# Patient Record
Sex: FEMALE | Race: BLACK OR AFRICAN AMERICAN | NOT HISPANIC OR LATINO | Employment: UNEMPLOYED | URBAN - METROPOLITAN AREA
[De-identification: names, ages, dates, MRNs, and addresses within clinical notes are randomized per-mention and may not be internally consistent; named-entity substitution may affect disease eponyms.]

---
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
## 2023-01-04 ENCOUNTER — OFFICE VISIT (OUTPATIENT)
Dept: CARDIOLOGY | Facility: CLINIC | Age: 18
End: 2023-01-04
Payer: COMMERCIAL

## 2023-01-04 VITALS
OXYGEN SATURATION: 98 % | BODY MASS INDEX: 20.53 KG/M2 | SYSTOLIC BLOOD PRESSURE: 118 MMHG | RESPIRATION RATE: 18 BRPM | WEIGHT: 123.38 LBS | DIASTOLIC BLOOD PRESSURE: 84 MMHG | HEART RATE: 120 BPM

## 2023-01-04 DIAGNOSIS — R00.0 SINUS TACHYCARDIA: Primary | ICD-10-CM

## 2023-01-04 DIAGNOSIS — I10 HYPERTENSION, UNSPECIFIED TYPE: ICD-10-CM

## 2023-01-04 DIAGNOSIS — E10.649 UNCONTROLLED TYPE 1 DIABETES MELLITUS WITH HYPOGLYCEMIA WITHOUT COMA: ICD-10-CM

## 2023-01-04 DIAGNOSIS — G44.59 OTHER COMPLICATED HEADACHE SYNDROME: ICD-10-CM

## 2023-01-04 DIAGNOSIS — M79.10 MYALGIA: ICD-10-CM

## 2023-01-04 PROCEDURE — 3074F PR MOST RECENT SYSTOLIC BLOOD PRESSURE < 130 MM HG: ICD-10-PCS | Mod: CPTII,S$GLB,, | Performed by: INTERNAL MEDICINE

## 2023-01-04 PROCEDURE — 3008F PR BODY MASS INDEX (BMI) DOCUMENTED: ICD-10-PCS | Mod: CPTII,S$GLB,, | Performed by: INTERNAL MEDICINE

## 2023-01-04 PROCEDURE — 3079F DIAST BP 80-89 MM HG: CPT | Mod: CPTII,S$GLB,, | Performed by: INTERNAL MEDICINE

## 2023-01-04 PROCEDURE — 93000 EKG 12-LEAD: ICD-10-PCS | Mod: S$GLB,,, | Performed by: INTERNAL MEDICINE

## 2023-01-04 PROCEDURE — 1159F MED LIST DOCD IN RCRD: CPT | Mod: CPTII,S$GLB,, | Performed by: INTERNAL MEDICINE

## 2023-01-04 PROCEDURE — 99214 OFFICE O/P EST MOD 30 MIN: CPT | Mod: S$GLB,,, | Performed by: INTERNAL MEDICINE

## 2023-01-04 PROCEDURE — 99214 PR OFFICE/OUTPT VISIT, EST, LEVL IV, 30-39 MIN: ICD-10-PCS | Mod: S$GLB,,, | Performed by: INTERNAL MEDICINE

## 2023-01-04 PROCEDURE — 1159F PR MEDICATION LIST DOCUMENTED IN MEDICAL RECORD: ICD-10-PCS | Mod: CPTII,S$GLB,, | Performed by: INTERNAL MEDICINE

## 2023-01-04 PROCEDURE — 3074F SYST BP LT 130 MM HG: CPT | Mod: CPTII,S$GLB,, | Performed by: INTERNAL MEDICINE

## 2023-01-04 PROCEDURE — 3079F PR MOST RECENT DIASTOLIC BLOOD PRESSURE 80-89 MM HG: ICD-10-PCS | Mod: CPTII,S$GLB,, | Performed by: INTERNAL MEDICINE

## 2023-01-04 PROCEDURE — 3008F BODY MASS INDEX DOCD: CPT | Mod: CPTII,S$GLB,, | Performed by: INTERNAL MEDICINE

## 2023-01-04 PROCEDURE — 93000 ELECTROCARDIOGRAM COMPLETE: CPT | Mod: S$GLB,,, | Performed by: INTERNAL MEDICINE

## 2023-01-04 PROCEDURE — 99999 PR PBB SHADOW E&M-EST. PATIENT-LVL V: ICD-10-PCS | Mod: PBBFAC,,, | Performed by: INTERNAL MEDICINE

## 2023-01-04 PROCEDURE — 99999 PR PBB SHADOW E&M-EST. PATIENT-LVL V: CPT | Mod: PBBFAC,,, | Performed by: INTERNAL MEDICINE

## 2023-01-04 NOTE — PROGRESS NOTES
CARDIOLOGY CLINIC VISIT        HISTORY OF PRESENT ILLNESS:     11/30/2022: Lois Mckeon presents for cardiovascular evaluation. Type 1 diabetic. A1c 8.4. Normal TSH.Female -> male transitioning. Testosterone tx. On occasion has suffered with sudden onset of headache. Frontal. Associated vision changes. Describes tunnel vision. He notes persistently elevated heart rate. EKG today sinus tachycardia. Also with episodes of hypertension. At recent dental visit was noted to have systolics in the 160s.    01/04/2023:  Echocardiogram shows normal function.  Holter showed sinus rhythm sinus sinus tachycardia.  Average heart rate 100 beats per minute.  Overall states that he does not feel well.  Generalized aches.  Myalgias.  Neck pain.  Labs unremarkable with regards to catecholamine/metanephrines.    CARDIOVASCULAR HISTORY:     None    PAST MEDICAL HISTORY:     Past Medical History:   Diagnosis Date    Asthma     Diabetes mellitus     IBS (irritable bowel syndrome)        PAST SURGICAL HISTORY:   No past surgical history on file.    ALLERGIES AND MEDICATION:     Review of patient's allergies indicates:   Allergen Reactions    Lactose Other (See Comments)    Banana Itching and Rash    Cantaloupe Itching and Rash    Grass pollen-june grass standard Itching and Rash    Kiwi (actinidia chinensis) Itching and Rash    Melon Itching and Rash    Pineapple Itching and Rash    Tomato (solanum lycopersicum) Itching and Rash        Medication List            Accurate as of January 4, 2023  2:56 PM. If you have any questions, ask your nurse or doctor.                CHANGE how you take these medications      cyproheptadine 4 mg tablet  Commonly known as: PERIACTIN  TAKE 1 TABLET BY MOUTH TWICE A DAY  What changed:   how much to take  when to take this            CONTINUE taking these medications      ACCU-CHEK GUIDE GLUCOSE METER Misc  Generic drug: blood-glucose meter  Use as directed to measure blood glucose levels daily     ACCU-CHEK  "GUIDE TEST STRIPS Strp  Generic drug: blood sugar diagnostic  USE AS DIRECTED TO TEST BLOOD GLUCOSE LEVELS UP TO 6 TIMES A DAY     acetone (urine) test Strp  Commonly known as: CHEMSTRIP K  Use to check urine for ketones if BG>300 or vomiting.     BAQSIMI 3 mg/actuation Spry  Generic drug: glucagon  1 spray by Nasal route as needed (hypoglycemia).     BD LUER-DARIUS SYRINGE 1 mL Syrg  Generic drug: syringe (disposable)  Use as directed to inject testosterone     BD REGULAR BEVEL NEEDLES 21 gauge x 1" Ndle  Generic drug: needle (disp) 21 G  Use as directed to draw up testosterone     EASY TOUCH ALCOHOL PREP PADS Padm  Generic drug: alcohol swabs  USE BEFORE AND AFTER TESTING 10 TIMES DAILY     hydrOXYzine pamoate 25 MG Cap  Commonly known as: VISTARIL     insulin aspart U-100 100 unit/mL injection  Commonly known as: NovoLOG U-100 Insulin aspart  ADMINISTER 200 UNITS VIA INSULIN PUMP EVERY 3 DAYS AS DIRECTED     insulin glargine 100 units/mL SubQ pen  Commonly known as: LANTUS SOLOSTAR U-100 INSULIN  Inject 17 units daily as directed. Give if pump failure or pump holiday.     lancets 33 gauge Misc  Commonly known as: ONETOUCH DELICA LANCETS  1 lancet by Misc.(Non-Drug; Combo Route) route as needed (patient to test 10 times daily). Patient testing 10 times daily     PRECISION XTRA B-KETONE Strp  Generic drug: ketone blood test  USE AS DIRECTED FOR BLOOD KETONE TESTING     * QUEtiapine 400 MG tablet  Commonly known as: SEROQUEL     * QUEtiapine 300 MG Tab  Commonly known as: SEROQUEL     testosterone cypionate 200 mg/mL injection  Commonly known as: DEPOTESTOTERONE CYPIONATE  INJECT 0.5 ML (100 MG TOTAL) INTO THE MUSCLE ONCE A WEEK.     TYLENOL ORAL     urine glucose-ketones test Strp  Commonly known as: KETO-DIASTIX  Check urine ketones when BG>300           * This list has 2 medication(s) that are the same as other medications prescribed for you. Read the directions carefully, and ask your doctor or other care provider " to review them with you.                  SOCIAL HISTORY:     Social History     Socioeconomic History    Marital status: Single   Tobacco Use    Smoking status: Never    Smokeless tobacco: Never   Substance and Sexual Activity    Alcohol use: No   Social History Narrative    PAST MEDICAL HISTORY:  Term birth, 7 pounds 5 ounces, immunizations up to date,  developmental milestones are normal, no hospitalizations. PAST SURGICAL HISTORY:  None. FAMILY HISTORY:  Significant for diabetes, ulcers, reflux, asthma, type 1  diabetes, rheumatoid arthritis, psoriasis and depression. SOCIAL HISTORY:  Reveals the patient lives with mom.  Parents are .  She has missed five or seven days of school for her symptoms.  There are pets in  the house, but no smokers. Pt is in [employer] program.            FAMILY HISTORY:     Family History   Problem Relation Age of Onset    Ulcers Mother     Psoriasis Mother     Rheum arthritis Father     Diabetes Brother         Type 1 diabetes    Rheum arthritis Maternal Uncle        REVIEW OF SYSTEMS:   Review of Systems   Constitutional:  Negative for chills, diaphoresis, fever, malaise/fatigue and weight loss.   Eyes:  Negative for blurred vision, double vision, photophobia and pain.   Respiratory:  Negative for sputum production, shortness of breath and wheezing.    Cardiovascular:  Positive for palpitations. Negative for chest pain, orthopnea, claudication, leg swelling and PND.   Gastrointestinal:  Negative for abdominal pain, heartburn, nausea and vomiting.   Musculoskeletal:  Positive for joint pain and myalgias. Negative for back pain, falls and neck pain.   Neurological:  Positive for dizziness and headaches. Negative for speech change, focal weakness, loss of consciousness and weakness.   Endo/Heme/Allergies:  Does not bruise/bleed easily.   Psychiatric/Behavioral:  Negative for depression, memory loss and substance abuse. The patient is not nervous/anxious.      PHYSICAL EXAM:      Vitals:    01/04/23 1415   BP: 118/84   Pulse: (!) 120   Resp: 18    Body mass index is 20.53 kg/m².  Weight: 56 kg (123 lb 5.6 oz)         Physical Exam  Vitals reviewed.   Constitutional:       General: He is not in acute distress.     Appearance: He is well-developed. He is not diaphoretic.   HENT:      Head: Normocephalic.   Neck:      Vascular: No carotid bruit or JVD.   Cardiovascular:      Rate and Rhythm: Regular rhythm. Tachycardia present.      Pulses: Normal pulses.      Heart sounds: Normal heart sounds.   Pulmonary:      Effort: Pulmonary effort is normal.      Breath sounds: Normal breath sounds.   Abdominal:      General: Bowel sounds are normal.      Palpations: Abdomen is soft.      Tenderness: There is no abdominal tenderness.   Skin:     General: Skin is warm and dry.   Neurological:      Mental Status: He is alert and oriented to person, place, and time.   Psychiatric:         Speech: Speech normal.         Behavior: Behavior normal.         Thought Content: Thought content normal.       DATA:   EKG: (personally reviewed tracing)  01/04/2023- sinus tachycardia  11/30/2022 - sinus tachycardia  Laboratory:  CBC:  Recent Labs   Lab 01/06/20  1304 06/01/20  1145 07/12/21  1618 09/30/22  1201 12/22/22  1647   WBC 4.78  --   --   --  6.24   Hemoglobin 12.9   < > 15.1 15.7 15.1   Hematocrit 40.6  --   --   --  43.8   Platelets 262  --   --   --  269    < > = values in this interval not displayed.       CHEMISTRIES:  Recent Labs   Lab 09/11/20  1530 12/16/20  1142 12/17/21  1230 12/22/22  1647   Glucose 123 H 81 249 H 242 H   Sodium 138 139 137 139   Potassium 3.6 4.0 4.3 3.7   BUN 11 6 11 10   Creatinine 0.8 1.0 1.0 1.0   eGFR if  SEE COMMENT SEE COMMENT >60.0  --    eGFR if non  SEE COMMENT SEE COMMENT >60.0  --    Calcium 9.3 9.4 9.7 9.4       CARDIAC BIOMARKERS:  Recent Labs   Lab 12/22/22  1647   Troponin I <0.006       COAGS:        LIPIDS/LFTS:  Recent Labs    Lab 01/06/20  1304 12/16/20  1142 07/27/22  1640 12/22/22  1647   Cholesterol 147 184 192  --    Triglycerides 68 66 128  --    HDL 47 54 54  --    LDL Cholesterol 86.4 116.8 112.4  --    Non-HDL Cholesterol 100 130 138  --    AST  --  23  --  15   ALT  --  5 L  --  6 L       Cardiovascular Testing:    Echocardiogram 12/30/2022:    The left ventricle is normal in size with concentric remodeling and normal systolic function.  The estimated ejection fraction is 60%.  Normal right ventricular size with normal right ventricular systolic function.    Holter 12/01/2022:    Sinus rhythm/sinus tachycardia. Heart rates varied between 61 and 138 BPM with an average of 100 BPM.  There were very rare PACs    ASSESSMENT:     Hypertension  Tachycardia  Headaches  T1DM  Testosterone tx  Myalgias    PLAN:     Hypertension:  2D echocardiogram shows normal function.  Lab work unremarkable with regards to catecholamines/metanephrines.  Check renal artery ultrasound.  Tachycardia: 24 hour holter unremarkable.  Myalgias:  Multiple generalized complaints.  Associated joint pain.  Rheumatology referral.  Return to clinic two months.           Naif Lamb MD, MPH, FACC, [facility]

## 2023-01-05 ENCOUNTER — PATIENT MESSAGE (OUTPATIENT)
Dept: RHEUMATOLOGY | Facility: CLINIC | Age: 18
End: 2023-01-05
Payer: COMMERCIAL

## 2023-01-13 ENCOUNTER — HOSPITAL ENCOUNTER (OUTPATIENT)
Dept: CARDIOLOGY | Facility: HOSPITAL | Age: 18
Discharge: HOME | End: 2023-01-13
Attending: INTERNAL MEDICINE
Payer: COMMERCIAL

## 2023-01-13 DIAGNOSIS — I10 HYPERTENSION, UNSPECIFIED TYPE: ICD-10-CM

## 2023-01-13 PROCEDURE — 93975 CV US RENAL ARTERY STENOSIS HYPERTENSION COMPLETE (CUPID ONLY): ICD-10-PCS | Mod: 26,,, | Performed by: INTERNAL MEDICINE

## 2023-01-13 PROCEDURE — 93975 VASCULAR STUDY: CPT | Mod: 26,,, | Performed by: INTERNAL MEDICINE

## 2023-01-13 PROCEDURE — 93975 VASCULAR STUDY: CPT

## 2023-01-18 LAB
ABDOMINAL AORTA MID PSV: 141 CM/S
LEFT RENAL DIST DIAS: 30 CM/S
LEFT RENAL DIST SYS: 78 CM/S
LEFT RENAL MID DIAS: 30 CM/S
LEFT RENAL MID RAR: 0.65
LEFT RENAL MID SYS: 91 CM/S
LEFT RENAL ORIGIN DIAS: 39 CM/S
LEFT RENAL ORIGIN SYS: 116 CM/S
LEFT RENAL PROX DIAS: 53 CM/S
LEFT RENAL PROX SYS: 115 CM/S
LEFT RENAL ULTRASOUND ACCELERATION TIME MEASUREMENT 1: 90 MS
LEFT RENAL ULTRASOUND ACCELERATION TIME MEASUREMENT 2: 60 MS
LEFT RENAL ULTRASOUND ACCELERATION TIME MEASUREMENT 3: 42 MS
LEFT RENAL ULTRASOUND ACCELERATION TIME MEASUREMENT AVERAGE: 90 MS
LEFT RENAL ULTRASOUND KIDNEY SIZE MEASUREMENT 1: 9.2 CM
LEFT RENAL ULTRASOUND KIDNEY SIZE MEASUREMENT 2: 9 CM
LEFT RENAL ULTRASOUND KIDNEY SIZE MEASUREMENT 3: 9.5 CM
LEFT RENAL ULTRASOUND KIDNEY SIZE MEASUREMENT AVERAGE: 9.5 CM
LEFT RENAL ULTRASOUND RESISTIVE INDEX MEASUREMENT 1: 0.65
LEFT RENAL ULTRASOUND RESISTIVE INDEX MEASUREMENT 2: 0.69
LEFT RENAL ULTRASOUND RESISTIVE INDEX MEASUREMENT 3: 0.62
LEFT RENAL ULTRASOUND RESISTIVE INDEX MEASUREMENT AVERAGE: 0.69
MID AORTIC TRANS: 1.5 CM
OHS CV LEFT RENAL RAR: 0.82
OHS CV RIGHT RENAL RAR: 0.83
OHS CV US LEFT RENAL HIGHEST EDV: 53
OHS CV US LEFT RENAL HIGHEST PSV: 116
OHS CV US RIGHT RENAL HIGHEST EDV: 41
OHS CV US RIGHT RENAL HIGHEST PSV: 117
RIGHT RENAL DIST DIAS: 29 CM/S
RIGHT RENAL DIST SYS: 59 CM/S
RIGHT RENAL MID DIAS: 36 CM/S
RIGHT RENAL MID RAR: 0.66
RIGHT RENAL MID SYS: 93 CM/S
RIGHT RENAL ORIGIN DIAS: 34 CM/S
RIGHT RENAL ORIGIN SYS: 117 CM/S
RIGHT RENAL PROX DIAS: 41 CM/S
RIGHT RENAL PROX SYS: 96 CM/S
RIGHT RENAL ULTRASOUND ACCELERATION TIME MEASUREMENT 1: 30 MS
RIGHT RENAL ULTRASOUND ACCELERATION TIME MEASUREMENT 2: 48 MS
RIGHT RENAL ULTRASOUND ACCELERATION TIME MEASUREMENT 3: 36 MS
RIGHT RENAL ULTRASOUND ACCELERATION TIME MEASUREMENT AVERAGE: 48 MS
RIGHT RENAL ULTRASOUND KIDNEY SIZE MEASUREMENT 1: 9.1 CM
RIGHT RENAL ULTRASOUND KIDNEY SIZE MEASUREMENT 2: 9.7 CM
RIGHT RENAL ULTRASOUND KIDNEY SIZE MEASUREMENT 3: 9.8 CM
RIGHT RENAL ULTRASOUND KIDNEY SIZE MEASUREMENT AVERAGE: 9.8 CM
RIGHT RENAL ULTRASOUND RESISTIVE INDEX MEASUREMENT 1: 0.61
RIGHT RENAL ULTRASOUND RESISTIVE INDEX MEASUREMENT 2: 0.51
RIGHT RENAL ULTRASOUND RESISTIVE INDEX MEASUREMENT 3: 0.46
RIGHT RENAL ULTRASOUND RESISTIVE INDEX MEASUREMENT AVERAGE: 0.61

## 2023-02-15 ENCOUNTER — OFFICE VISIT (OUTPATIENT)
Dept: PEDIATRIC ENDOCRINOLOGY | Facility: CLINIC | Age: 18
End: 2023-02-15
Payer: COMMERCIAL

## 2023-02-15 VITALS
WEIGHT: 120.13 LBS | HEIGHT: 65 IN | HEART RATE: 119 BPM | SYSTOLIC BLOOD PRESSURE: 132 MMHG | BODY MASS INDEX: 20.01 KG/M2 | DIASTOLIC BLOOD PRESSURE: 78 MMHG

## 2023-02-15 DIAGNOSIS — E10.65 TYPE 1 DIABETES MELLITUS WITH HYPERGLYCEMIA: ICD-10-CM

## 2023-02-15 DIAGNOSIS — Z78.9 FEMALE-TO-MALE TRANSGENDER PERSON: ICD-10-CM

## 2023-02-15 DIAGNOSIS — E10.9 TYPE 1 DIABETES MELLITUS WITHOUT COMPLICATION: ICD-10-CM

## 2023-02-15 DIAGNOSIS — E10.65 UNCONTROLLED TYPE 1 DIABETES MELLITUS WITH HYPERGLYCEMIA: ICD-10-CM

## 2023-02-15 DIAGNOSIS — E10.65 UNCONTROLLED TYPE 1 DIABETES MELLITUS WITH HYPERGLYCEMIA: Primary | ICD-10-CM

## 2023-02-15 DIAGNOSIS — E10.649 UNCONTROLLED TYPE 1 DIABETES MELLITUS WITH HYPOGLYCEMIA WITHOUT COMA: ICD-10-CM

## 2023-02-15 LAB — HBA1C MFR BLD: 9.7 % (ref 4–6.4)

## 2023-02-15 PROCEDURE — 3075F PR MOST RECENT SYSTOLIC BLOOD PRESS GE 130-139MM HG: ICD-10-PCS | Mod: CPTII,S$GLB,, | Performed by: NURSE PRACTITIONER

## 2023-02-15 PROCEDURE — 1159F PR MEDICATION LIST DOCUMENTED IN MEDICAL RECORD: ICD-10-PCS | Mod: CPTII,S$GLB,, | Performed by: NURSE PRACTITIONER

## 2023-02-15 PROCEDURE — 3078F DIAST BP <80 MM HG: CPT | Mod: CPTII,S$GLB,, | Performed by: NURSE PRACTITIONER

## 2023-02-15 PROCEDURE — 3046F PR MOST RECENT HEMOGLOBIN A1C LEVEL > 9.0%: ICD-10-PCS | Mod: CPTII,S$GLB,, | Performed by: NURSE PRACTITIONER

## 2023-02-15 PROCEDURE — 99999 PR PBB SHADOW E&M-EST. PATIENT-LVL V: CPT | Mod: PBBFAC,,, | Performed by: NURSE PRACTITIONER

## 2023-02-15 PROCEDURE — 99215 OFFICE O/P EST HI 40 MIN: CPT | Mod: S$GLB,,, | Performed by: NURSE PRACTITIONER

## 2023-02-15 PROCEDURE — 3008F BODY MASS INDEX DOCD: CPT | Mod: CPTII,S$GLB,, | Performed by: NURSE PRACTITIONER

## 2023-02-15 PROCEDURE — 99999 PR PBB SHADOW E&M-EST. PATIENT-LVL V: ICD-10-PCS | Mod: PBBFAC,,, | Performed by: NURSE PRACTITIONER

## 2023-02-15 PROCEDURE — 3008F PR BODY MASS INDEX (BMI) DOCUMENTED: ICD-10-PCS | Mod: CPTII,S$GLB,, | Performed by: NURSE PRACTITIONER

## 2023-02-15 PROCEDURE — 83036 POCT HEMOGLOBIN A1C: ICD-10-PCS | Mod: QW,S$GLB,, | Performed by: NURSE PRACTITIONER

## 2023-02-15 PROCEDURE — 99215 PR OFFICE/OUTPT VISIT, EST, LEVL V, 40-54 MIN: ICD-10-PCS | Mod: S$GLB,,, | Performed by: NURSE PRACTITIONER

## 2023-02-15 PROCEDURE — 1160F RVW MEDS BY RX/DR IN RCRD: CPT | Mod: CPTII,S$GLB,, | Performed by: NURSE PRACTITIONER

## 2023-02-15 PROCEDURE — 1160F PR REVIEW ALL MEDS BY PRESCRIBER/CLIN PHARMACIST DOCUMENTED: ICD-10-PCS | Mod: CPTII,S$GLB,, | Performed by: NURSE PRACTITIONER

## 2023-02-15 PROCEDURE — 1159F MED LIST DOCD IN RCRD: CPT | Mod: CPTII,S$GLB,, | Performed by: NURSE PRACTITIONER

## 2023-02-15 PROCEDURE — 3046F HEMOGLOBIN A1C LEVEL >9.0%: CPT | Mod: CPTII,S$GLB,, | Performed by: NURSE PRACTITIONER

## 2023-02-15 PROCEDURE — 83036 HEMOGLOBIN GLYCOSYLATED A1C: CPT | Mod: QW,S$GLB,, | Performed by: NURSE PRACTITIONER

## 2023-02-15 PROCEDURE — 3078F PR MOST RECENT DIASTOLIC BLOOD PRESSURE < 80 MM HG: ICD-10-PCS | Mod: CPTII,S$GLB,, | Performed by: NURSE PRACTITIONER

## 2023-02-15 PROCEDURE — 3075F SYST BP GE 130 - 139MM HG: CPT | Mod: CPTII,S$GLB,, | Performed by: NURSE PRACTITIONER

## 2023-02-15 RX ORDER — NYSTATIN 100000 [USP'U]/ML
SUSPENSION ORAL
COMMUNITY
Start: 2023-02-06

## 2023-02-15 RX ORDER — GLUCAGON 3 MG/1
1 POWDER NASAL
Qty: 2 EACH | Refills: 2 | Status: CANCELLED | OUTPATIENT
Start: 2023-02-15

## 2023-02-15 RX ORDER — GLUCAGON 3 MG/1
1 POWDER NASAL
Qty: 2 EACH | Refills: 2 | Status: SHIPPED | OUTPATIENT
Start: 2023-02-15

## 2023-02-15 RX ORDER — INSULIN ASPART 100 [IU]/ML
INJECTION, SOLUTION INTRAVENOUS; SUBCUTANEOUS
Qty: 30 ML | Refills: 3 | Status: SHIPPED | OUTPATIENT
Start: 2023-02-15 | End: 2023-09-18 | Stop reason: SDUPTHER

## 2023-02-15 RX ORDER — TESTOSTERONE CYPIONATE 200 MG/ML
INJECTION, SOLUTION INTRAMUSCULAR
Qty: 2 ML | Refills: 5 | Status: SHIPPED | OUTPATIENT
Start: 2023-02-15 | End: 2023-04-14 | Stop reason: SDUPTHER

## 2023-02-15 RX ORDER — HYDROXYZINE HYDROCHLORIDE 25 MG/ML
INJECTION, SOLUTION INTRAMUSCULAR
COMMUNITY
Start: 2022-10-06 | End: 2023-05-09 | Stop reason: DRUGHIGH

## 2023-02-15 RX ORDER — INSULIN ASPART 100 [IU]/ML
INJECTION, SOLUTION INTRAVENOUS; SUBCUTANEOUS
Qty: 30 ML | Refills: 3 | Status: CANCELLED | OUTPATIENT
Start: 2023-02-15

## 2023-02-15 NOTE — PATIENT INSTRUCTIONS
Insulin Instructions  Pump Settings   insulin aspart U-100 100 unit/mL injection (NovoLOG)   Last edited by Abbie Fernandez MD on 10/27/2022 at 5:14 PM      Basal Rate   Total Basal Dose: 14.4 units/day   Time units/hr   12:00 AM 0.6    4:00 AM 0.6      Blood Glucose Target   Time mg/dL   12:00  - 120    9:00  - 150      Sensitivity Factor   Time mg/dL/unit   12:00 AM 60      Carb Ratio   Time g/unit   12:00 AM 10

## 2023-02-15 NOTE — PROGRESS NOTES
"Nish Mckeon is a 17 y.o. being seen in the pediatric endocrinology clinic today in follow up for type 1 diabetes mellitus.     Nish was diagnosed with type 1 diabetes in 3/2009. He was last seen in our endocrine clinic for a diabetes visit in November 2022.  Nish identifies as a transgender male and started on hormone therapy with testosterone in August 2019. He was last seen in gender clinic in September 2022. Nish prefers the use of male pronouns (he/him).  He sees mental health providers, Dr. Shook and Jean Marie Sorto.    Interval History Diabetes:   He is currently on CSII using Tandem TSlim, started on 11/23/2021. Previously using the DialMyApptronic 630 pump. He wears the Dexcom CGM and uses the control IQ program in the pump. He has not had a Dexcom CGM on since his last visit in November 2022. The DME company told him they would be sent but he never received anything.  No DKA since his last visit.     Nish reports he has not been feeling well but "haven't been sick". He states his blood sugars have been higher than usual. He has been seen by cardiology since the last visit due to elevated BP noted while at a dental visit. Cardiology work up did not show anything cardiac related. Recommended he see a rheumatologist for evaluation. He is having oral thrush and currently on Nystatin.    Glucose Data:  Meter: he is using multiple meters, "whatever strips he can find"  Meter review in office is an [place]: BG readings between  mg/dl, most readings in the mid 200s - mid 300s range. He is checking glucose 3-4 times a day.    Pump Data:        Interpretation: not entering glucose readings into the pump consistently so very little correction being given, running out of insulin for several hours every few days before restarting    Nish is having rare episodes of hypoglycemia per week. He reports one recent low glucose of 40 mg/dl. Associated symptoms of hypoglycemia are  weak and shaky . He reports symptoms of " "hyperglycemia such as nocturia, headaches, nausea, increased thirst, and polyuria.     Infusion sites: arm(s).     Insulin Instructions  Pump Settings   insulin aspart U-100 100 unit/mL injection (NovoLOG)   Last edited by Abbie Fernandez MD on 10/27/2022 at 5:14 PM      Basal Rate   Total Basal Dose: 14.4 units/day   Time units/hr   12:00 AM 0.6    4:00 AM 0.6      Blood Glucose Target   Time mg/dL   12:00  - 120    9:00  - 150      Sensitivity Factor   Time mg/dL/unit   12:00 AM 60      Carb Ratio   Time g/unit   12:00 AM 10     Nutrition/Exercise:  Nutrition: carb counting but is not on a specified limit    Review of growth chart shows: 1 lb weight loss since last visit    Exercise: no physical activity    Review of Systems:  Constitutional: Negative for fever.   HENT: Negative for congestion and sore throat.  + oral thrush  Eyes: +blurry vision   Respiratory: Negative for cough and shortness of breath.    Cardiovascular: + for chest pain, palpitations  Gastrointestinal: + for nausea, constipation, and abdominal pain intermittently   Musculoskeletal: + for myalgias.   Skin: Negative for rash.   Neurological: + for headaches.   Psychiatric/Behavioral: Negative for behavioral problems. +anxiety    Past Medical/Family/Surgical History:  I have reviewed, and verified the past medical, surgical, and family history and updated as appropriate. No changes reported.    Social History:  He is at [employer] and doing well.  Classes are harder this semester.    Meds:  Reviewed and reconciled.     Physical Exam:  /78 (BP Location: Left arm)   Pulse (!) 119   Ht 5' 5.16" (1.655 m)   Wt 54.5 kg (120 lb 2.4 oz)   BMI 19.90 kg/m²    General: alert, active, engaged in visit  HEENT: mucous membranes moist, no erythema  Injection Sites: normal to upper arms, hard nodular areas on abdomen at infusion sites  Eyes:  Conjunctivae are normal  Lungs: Effort normal, breath sounds clear.  Cardiovascular: Regular rhythm, " + tachycardia, no murmur  Abdomen: soft, non-tender to palpation, non-distended  Foot exam: no skin breakdown or lesions    Labs:  Hemoglobin A1C   Date Value Ref Range Status   11/16/2022 8.4 (H) 4.0 - 5.6 % Final     Comment:     ADA Screening Guidelines:  5.7-6.4%  Consistent with prediabetes  >or=6.5%  Consistent with diabetes    High levels of fetal hemoglobin interfere with the HbA1C  assay. Heterozygous hemoglobin variants (HbS, HgC, etc)do  not significantly interfere with this assay.   However, presence of multiple variants may affect accuracy.     07/27/2022 8.6 (H) 4.0 - 5.6 % Final     Comment:     ADA Screening Guidelines:  5.7-6.4%  Consistent with prediabetes  >or=6.5%  Consistent with diabetes    High levels of fetal hemoglobin interfere with the HbA1C  assay. Heterozygous hemoglobin variants (HbS, HgC, etc)do  not significantly interfere with this assay.   However, presence of multiple variants may affect accuracy.     03/16/2022 9.0 (H) 4.0 - 5.6 % Final     Comment:     ADA Screening Guidelines:  5.7-6.4%  Consistent with prediabetes  >or=6.5%  Consistent with diabetes    High levels of fetal hemoglobin interfere with the HbA1C  assay. Heterozygous hemoglobin variants (HbS, HgC, etc)do  not significantly interfere with this assay.   However, presence of multiple variants may affect accuracy.         Last Screening tests:    Lab Results   Component Value Date    TSH 1.770 12/22/2022     Microalbumin  Lab Results   Component Value Date    LABMICR 22.0 11/21/2022    CREATRANDUR 157.7 12/22/2022    MICALBCREAT Unable to calculate 11/21/2022       Component      Latest Ref Rng & Units 4/9/2021   TTG IgA      <20 UNITS 6   IgA      40 - 350 mg/dL 178     Component      Latest Ref Rng & Units 7/27/2022   Cholesterol      120 - 199 mg/dL 192   Triglycerides      30 - 150 mg/dL 128   HDL      40 - 75 mg/dL 54   LDL Cholesterol External      63.0 - 159.0 mg/dL 112.4   HDL/Cholesterol Ratio      20.0 - 50.0  % 28.1   Total Cholesterol/HDL Ratio      2.0 - 5.0 3.6   Non-HDL Cholesterol      mg/dL 138     Eye Exam: Dr. Mann's office - July 2022    Gender Dysphoria  He is on testosterone 100 mg weekly. He gets the injections on Thursdays.     Assessment/Plan:  Nish is a 17 y.o. with T1D of 13 yrs 11 months duration. Nish has gender dysphoria, history of anxiety, depression, visual and auditory hallucinations. A1c has increased since the last visit, up from 8.4%.     Component      Latest Ref Rng & Units 2/15/2023   Hemoglobin A1C, POC      4 - 6.4 % 9.7 (A)     Nish's diabetes is under poor control. His A1C is very elevated and increased significantly since his last visit. His decline in glycemic control is likely related to his being unable to use a CGM and the Control IQ feature on his Tandem pump.    His blood ugars and insulin settings were reviewed for the past four weeks. I reviewed and adjusted insulin dose: no adjustments were made today.     Screening tests:  Depression screen: administered PHQ-9 modified for adolescents today, 2/15/2023. Total score: 4, 0 for question #9.  Lipid panel screening - recommended in 3 years if normal, LDL goal <100: Due July 2023  Thyroid screening annually - Due 12/2023  Celiac screen - baseline and 2 yrs after DM diagnosis: last checked 4/2021- normal  Eye Exam: Every 1-2 years after 5 years of DM duration (if under good control): Due July 2023  Comprehensive Foot Exam: Annually after 5 years of DM duration:  Due 11/2023  Microablumin/creatinine ratio: Annually after 5 yrs of DM duration: Due 11/2023    Follow up in 1 month virtual visit to review pump and CGM data.  Follow up in 3 months with Dr. Fernandez for regular follow up.  Discussed transition to adult endocrinology. Nish is hesitant in making the transition at this time. We discussed deferring this until the summer and he agreed.    It was a pleasure to see your patient in clinic today. Please call with any questions or  concerns.        Liliana MORRISON, CPNP  Pediatric Endocrinology    Time spent: 52 minutes

## 2023-03-07 ENCOUNTER — OFFICE VISIT (OUTPATIENT)
Dept: CARDIOLOGY | Facility: CLINIC | Age: 18
End: 2023-03-07
Payer: COMMERCIAL

## 2023-03-07 VITALS
BODY MASS INDEX: 20.45 KG/M2 | RESPIRATION RATE: 18 BRPM | SYSTOLIC BLOOD PRESSURE: 123 MMHG | OXYGEN SATURATION: 98 % | WEIGHT: 123.44 LBS | HEART RATE: 110 BPM | DIASTOLIC BLOOD PRESSURE: 71 MMHG

## 2023-03-07 DIAGNOSIS — E10.649 UNCONTROLLED TYPE 1 DIABETES MELLITUS WITH HYPOGLYCEMIA WITHOUT COMA: ICD-10-CM

## 2023-03-07 DIAGNOSIS — I10 HYPERTENSION, UNSPECIFIED TYPE: Primary | ICD-10-CM

## 2023-03-07 DIAGNOSIS — R00.0 SINUS TACHYCARDIA: ICD-10-CM

## 2023-03-07 DIAGNOSIS — M79.10 MYALGIA: ICD-10-CM

## 2023-03-07 PROCEDURE — 1159F MED LIST DOCD IN RCRD: CPT | Mod: CPTII,S$GLB,, | Performed by: INTERNAL MEDICINE

## 2023-03-07 PROCEDURE — 1159F PR MEDICATION LIST DOCUMENTED IN MEDICAL RECORD: ICD-10-PCS | Mod: CPTII,S$GLB,, | Performed by: INTERNAL MEDICINE

## 2023-03-07 PROCEDURE — 3008F BODY MASS INDEX DOCD: CPT | Mod: CPTII,S$GLB,, | Performed by: INTERNAL MEDICINE

## 2023-03-07 PROCEDURE — 3008F PR BODY MASS INDEX (BMI) DOCUMENTED: ICD-10-PCS | Mod: CPTII,S$GLB,, | Performed by: INTERNAL MEDICINE

## 2023-03-07 PROCEDURE — 3046F HEMOGLOBIN A1C LEVEL >9.0%: CPT | Mod: CPTII,S$GLB,, | Performed by: INTERNAL MEDICINE

## 2023-03-07 PROCEDURE — 99999 PR PBB SHADOW E&M-EST. PATIENT-LVL V: CPT | Mod: PBBFAC,,, | Performed by: INTERNAL MEDICINE

## 2023-03-07 PROCEDURE — 99214 PR OFFICE/OUTPT VISIT, EST, LEVL IV, 30-39 MIN: ICD-10-PCS | Mod: S$GLB,,, | Performed by: INTERNAL MEDICINE

## 2023-03-07 PROCEDURE — 1160F RVW MEDS BY RX/DR IN RCRD: CPT | Mod: CPTII,S$GLB,, | Performed by: INTERNAL MEDICINE

## 2023-03-07 PROCEDURE — 3078F DIAST BP <80 MM HG: CPT | Mod: CPTII,S$GLB,, | Performed by: INTERNAL MEDICINE

## 2023-03-07 PROCEDURE — 3074F PR MOST RECENT SYSTOLIC BLOOD PRESSURE < 130 MM HG: ICD-10-PCS | Mod: CPTII,S$GLB,, | Performed by: INTERNAL MEDICINE

## 2023-03-07 PROCEDURE — 99214 OFFICE O/P EST MOD 30 MIN: CPT | Mod: S$GLB,,, | Performed by: INTERNAL MEDICINE

## 2023-03-07 PROCEDURE — 3074F SYST BP LT 130 MM HG: CPT | Mod: CPTII,S$GLB,, | Performed by: INTERNAL MEDICINE

## 2023-03-07 PROCEDURE — 99999 PR PBB SHADOW E&M-EST. PATIENT-LVL V: ICD-10-PCS | Mod: PBBFAC,,, | Performed by: INTERNAL MEDICINE

## 2023-03-07 PROCEDURE — 3078F PR MOST RECENT DIASTOLIC BLOOD PRESSURE < 80 MM HG: ICD-10-PCS | Mod: CPTII,S$GLB,, | Performed by: INTERNAL MEDICINE

## 2023-03-07 PROCEDURE — 1160F PR REVIEW ALL MEDS BY PRESCRIBER/CLIN PHARMACIST DOCUMENTED: ICD-10-PCS | Mod: CPTII,S$GLB,, | Performed by: INTERNAL MEDICINE

## 2023-03-07 PROCEDURE — 3046F PR MOST RECENT HEMOGLOBIN A1C LEVEL > 9.0%: ICD-10-PCS | Mod: CPTII,S$GLB,, | Performed by: INTERNAL MEDICINE

## 2023-03-07 NOTE — PROGRESS NOTES
CARDIOLOGY CLINIC VISIT        HISTORY OF PRESENT ILLNESS:     11/30/2022: Lois Mckeon presents for cardiovascular evaluation. Type 1 diabetic. A1c 8.4. Normal TSH.Female -> male transitioning. Testosterone tx. On occasion has suffered with sudden onset of headache. Frontal. Associated vision changes. Describes tunnel vision. He notes persistently elevated heart rate. EKG today sinus tachycardia. Also with episodes of hypertension. At recent dental visit was noted to have systolics in the 160s.    01/04/2023:  Echocardiogram shows normal function.  Holter showed sinus rhythm sinus sinus tachycardia.  Average heart rate 100 beats per minute.  Overall states that he does not feel well.  Generalized aches.  Myalgias.  Neck pain.  Labs unremarkable with regards to catecholamine/metanephrines.    03/07/2023:  Renal artery ultrasound did not suggest renal artery stenosis.  No elevated velocities.  Normal kidney size.  Overall he states that his blood pressure has been well controlled.  Did have 1 episode of systolic in the 90s.  Blood sugar at that time was normal according to him.  He has also had an episode of hypoglycemia.  Visit to pediatric endocrinology showed blood sugars ranging from .  Most readings 200s to 300s.    CARDIOVASCULAR HISTORY:     None    PAST MEDICAL HISTORY:     Past Medical History:   Diagnosis Date    Asthma     Diabetes mellitus     IBS (irritable bowel syndrome)        PAST SURGICAL HISTORY:   History reviewed. No pertinent surgical history.    ALLERGIES AND MEDICATION:     Review of patient's allergies indicates:   Allergen Reactions    Lactose Other (See Comments)    Banana Itching and Rash    Cantaloupe Itching and Rash    Grass pollen-june grass standard Itching and Rash    Kiwi (actinidia chinensis) Itching and Rash    Melon Itching and Rash    Pineapple Itching and Rash    Tomato (solanum lycopersicum) Itching and Rash        Medication List            Accurate as of March 7, 2023   "9:34 AM. If you have any questions, ask your nurse or doctor.                CHANGE how you take these medications      cyproheptadine 4 mg tablet  Commonly known as: PERIACTIN  TAKE 1 TABLET BY MOUTH TWICE A DAY  What changed:   how much to take  when to take this            CONTINUE taking these medications      ACCU-CHEK GUIDE GLUCOSE METER Misc  Generic drug: blood-glucose meter  Use as directed to measure blood glucose levels daily     ACCU-CHEK GUIDE TEST STRIPS Strp  Generic drug: blood sugar diagnostic  USE AS DIRECTED TO TEST BLOOD GLUCOSE LEVELS UP TO 6 TIMES A DAY     acetone (urine) test Strp  Commonly known as: CHEMSTRIP K  Please use as directed to test for urine ketones with blood sugar >250 mg/dL or patient is ill     BAQSIMI 3 mg/actuation Spry  Generic drug: glucagon  1 spray by Nasal route as needed (hypoglycemia).     BD LUER-DARIUS SYRINGE 1 mL Syrg  Generic drug: syringe (disposable)  Use as directed to inject testosterone     BD REGULAR BEVEL NEEDLES 21 gauge x 1" Ndle  Generic drug: needle (disp) 21 G  Use as directed to draw up testosterone     EASY TOUCH ALCOHOL PREP PADS Padm  Generic drug: alcohol swabs  USE BEFORE AND AFTER TESTING 10 TIMES DAILY     hydrOXYzine 25 mg/mL injection  Commonly known as: VISTARIL     hydrOXYzine pamoate 25 MG Cap  Commonly known as: VISTARIL     insulin aspart U-100 100 unit/mL injection  Commonly known as: NovoLOG U-100 Insulin aspart  ADMINISTER 200 UNITS VIA INSULIN PUMP EVERY 3 DAYS AS DIRECTED     insulin glargine 100 units/mL SubQ pen  Commonly known as: LANTUS SOLOSTAR U-100 INSULIN  Inject 17 units daily as directed. Give if pump failure or pump holiday.     lancets 33 gauge Misc  Commonly known as: ONETOUCH DELICA LANCETS  1 lancet by Misc.(Non-Drug; Combo Route) route as needed (patient to test 10 times daily). Patient testing 10 times daily     nystatin 100,000 unit/mL suspension  Commonly known as: MYCOSTATIN     PRECISION XTRA B-KETONE " Strp  Generic drug: ketone blood test  USE AS DIRECTED FOR BLOOD KETONE TESTING     * QUEtiapine 400 MG tablet  Commonly known as: SEROQUEL     * QUEtiapine 300 MG Tab  Commonly known as: SEROQUEL     testosterone cypionate 200 mg/mL injection  Commonly known as: DEPOTESTOTERONE CYPIONATE  INJECT 0.5 ML (100 MG TOTAL) INTO THE MUSCLE ONCE A WEEK.     TYLENOL ORAL     urine glucose-ketones test Strp  Commonly known as: KETO-DIASTIX  Check urine ketones when BG>300           * This list has 2 medication(s) that are the same as other medications prescribed for you. Read the directions carefully, and ask your doctor or other care provider to review them with you.                  SOCIAL HISTORY:     Social History     Socioeconomic History    Marital status: Single   Tobacco Use    Smoking status: Never    Smokeless tobacco: Never   Substance and Sexual Activity    Alcohol use: No   Social History Narrative    PAST MEDICAL HISTORY:  Term birth, 7 pounds 5 ounces, immunizations up to date,  developmental milestones are normal, no hospitalizations. PAST SURGICAL HISTORY:  None. FAMILY HISTORY:  Significant for diabetes, ulcers, reflux, asthma, type 1  diabetes, rheumatoid arthritis, psoriasis and depression. SOCIAL HISTORY:  Reveals the patient lives with mom.  Parents are .  She has missed five or seven days of school for her symptoms.  There are pets in  the house, but no smokers. Pt is in [employer] program.            FAMILY HISTORY:     Family History   Problem Relation Age of Onset    Ulcers Mother     Psoriasis Mother     Rheum arthritis Father     Diabetes Brother         Type 1 diabetes    Rheum arthritis Maternal Uncle        REVIEW OF SYSTEMS:   Review of Systems   Constitutional:  Negative for chills, diaphoresis, fever, malaise/fatigue and weight loss.   Eyes:  Negative for blurred vision, double vision, photophobia and pain.   Respiratory:  Negative for sputum production, shortness of breath and wheezing.     Cardiovascular:  Positive for palpitations. Negative for chest pain, orthopnea, claudication, leg swelling and PND.   Gastrointestinal:  Negative for abdominal pain, heartburn, nausea and vomiting.   Musculoskeletal:  Positive for joint pain and myalgias. Negative for back pain, falls and neck pain.   Neurological:  Positive for dizziness and headaches. Negative for speech change, focal weakness, loss of consciousness and weakness.   Endo/Heme/Allergies:  Does not bruise/bleed easily.   Psychiatric/Behavioral:  Negative for depression, memory loss and substance abuse. The patient is not nervous/anxious.      PHYSICAL EXAM:     Vitals:    03/07/23 0850   BP: 123/71   Pulse: 110   Resp: 18    Body mass index is 20.45 kg/m².  Weight: 56 kg (123 lb 7.3 oz)         Physical Exam  Vitals reviewed.   Constitutional:       General: He is not in acute distress.     Appearance: He is well-developed. He is not diaphoretic.   HENT:      Head: Normocephalic.   Neck:      Vascular: No carotid bruit or JVD.   Cardiovascular:      Rate and Rhythm: Regular rhythm. Tachycardia present.      Pulses: Normal pulses.      Heart sounds: Normal heart sounds.   Pulmonary:      Effort: Pulmonary effort is normal.      Breath sounds: Normal breath sounds.   Abdominal:      General: Bowel sounds are normal.      Palpations: Abdomen is soft.      Tenderness: There is no abdominal tenderness.   Skin:     General: Skin is warm and dry.   Neurological:      Mental Status: He is alert and oriented to person, place, and time.   Psychiatric:         Speech: Speech normal.         Behavior: Behavior normal.         Thought Content: Thought content normal.       DATA:   EKG: (personally reviewed tracing)  01/04/2023- sinus tachycardia  11/30/2022 - sinus tachycardia  Laboratory:  CBC:  Recent Labs   Lab 07/12/21  1618 09/30/22  1201 12/22/22  1647   WBC  --   --  6.24   Hemoglobin 15.1 15.7 15.1   Hematocrit  --   --  43.8   Platelets  --   --  269        CHEMISTRIES:  Recent Labs   Lab 09/11/20  1530 12/16/20  1142 12/17/21  1230 12/22/22  1647   Glucose 123 H 81 249 H 242 H   Sodium 138 139 137 139   Potassium 3.6 4.0 4.3 3.7   BUN 11 6 11 10   Creatinine 0.8 1.0 1.0 1.0   eGFR if  SEE COMMENT SEE COMMENT >60.0  --    eGFR if non  SEE COMMENT SEE COMMENT >60.0  --    Calcium 9.3 9.4 9.7 9.4       CARDIAC BIOMARKERS:  Recent Labs   Lab 12/22/22  1647   Troponin I <0.006       COAGS:        LIPIDS/LFTS:  Recent Labs   Lab 12/16/20  1142 07/27/22  1640 12/22/22  1647   Cholesterol 184 192  --    Triglycerides 66 128  --    HDL 54 54  --    LDL Cholesterol 116.8 112.4  --    Non-HDL Cholesterol 130 138  --    AST 23  --  15   ALT 5 L  --  6 L       Cardiovascular Testing:    Renal artery ultrasound 01/13/2023:    There is insignificant stenosis (0-59%) in the Right Renal Artery.  Right kidney 9.80 cm.  There is insignificant stenosis (0-59%) in the Left Renal Artery.  Left kidney 9.50 cm.    Echocardiogram 12/30/2022:    The left ventricle is normal in size with concentric remodeling and normal systolic function.  The estimated ejection fraction is 60%.  Normal right ventricular size with normal right ventricular systolic function.    Holter 12/01/2022:    Sinus rhythm/sinus tachycardia. Heart rates varied between 61 and 138 BPM with an average of 100 BPM.  There were very rare PACs    ASSESSMENT:     Hypertension  Tachycardia:  Multifactorial.  Headaches  T1DM  Testosterone tx  Myalgias    PLAN:     Hypertension:  Blood pressures at home have been normal.  Monitor.  2D echocardiogram shows normal function.  Lab work unremarkable with regards to catecholamines/metanephrines.  Renal artery ultrasound negative.  Tachycardia: 24 hour holter unremarkable.  Myalgias:  Multiple generalized complaints.  Associated joint pain.  Rheumatology referral.  Return to clinic 4 months.           Naif Lamb MD, MPH, FACC, [facility]

## 2023-03-29 ENCOUNTER — PATIENT MESSAGE (OUTPATIENT)
Dept: PEDIATRIC ENDOCRINOLOGY | Facility: CLINIC | Age: 18
End: 2023-03-29

## 2023-04-04 ENCOUNTER — OFFICE VISIT (OUTPATIENT)
Dept: RHEUMATOLOGY | Facility: CLINIC | Age: 18
End: 2023-04-04
Payer: COMMERCIAL

## 2023-04-04 ENCOUNTER — LAB VISIT (OUTPATIENT)
Dept: LAB | Facility: HOSPITAL | Age: 18
End: 2023-04-04
Attending: STUDENT IN AN ORGANIZED HEALTH CARE EDUCATION/TRAINING PROGRAM
Payer: COMMERCIAL

## 2023-04-04 VITALS
DIASTOLIC BLOOD PRESSURE: 81 MMHG | BODY MASS INDEX: 20.94 KG/M2 | HEART RATE: 114 BPM | SYSTOLIC BLOOD PRESSURE: 135 MMHG | WEIGHT: 125.69 LBS | HEIGHT: 65 IN

## 2023-04-04 DIAGNOSIS — M79.10 MYALGIA: ICD-10-CM

## 2023-04-04 DIAGNOSIS — M79.10 MYALGIA: Primary | ICD-10-CM

## 2023-04-04 PROCEDURE — 1159F MED LIST DOCD IN RCRD: CPT | Mod: CPTII,S$GLB,, | Performed by: STUDENT IN AN ORGANIZED HEALTH CARE EDUCATION/TRAINING PROGRAM

## 2023-04-04 PROCEDURE — 3079F DIAST BP 80-89 MM HG: CPT | Mod: CPTII,S$GLB,, | Performed by: STUDENT IN AN ORGANIZED HEALTH CARE EDUCATION/TRAINING PROGRAM

## 2023-04-04 PROCEDURE — 99203 OFFICE O/P NEW LOW 30 MIN: CPT | Mod: S$GLB,,, | Performed by: STUDENT IN AN ORGANIZED HEALTH CARE EDUCATION/TRAINING PROGRAM

## 2023-04-04 PROCEDURE — 99203 PR OFFICE/OUTPT VISIT, NEW, LEVL III, 30-44 MIN: ICD-10-PCS | Mod: S$GLB,,, | Performed by: STUDENT IN AN ORGANIZED HEALTH CARE EDUCATION/TRAINING PROGRAM

## 2023-04-04 PROCEDURE — 3079F PR MOST RECENT DIASTOLIC BLOOD PRESSURE 80-89 MM HG: ICD-10-PCS | Mod: CPTII,S$GLB,, | Performed by: STUDENT IN AN ORGANIZED HEALTH CARE EDUCATION/TRAINING PROGRAM

## 2023-04-04 PROCEDURE — 3046F PR MOST RECENT HEMOGLOBIN A1C LEVEL > 9.0%: ICD-10-PCS | Mod: CPTII,S$GLB,, | Performed by: STUDENT IN AN ORGANIZED HEALTH CARE EDUCATION/TRAINING PROGRAM

## 2023-04-04 PROCEDURE — 3008F BODY MASS INDEX DOCD: CPT | Mod: CPTII,S$GLB,, | Performed by: STUDENT IN AN ORGANIZED HEALTH CARE EDUCATION/TRAINING PROGRAM

## 2023-04-04 PROCEDURE — 1159F PR MEDICATION LIST DOCUMENTED IN MEDICAL RECORD: ICD-10-PCS | Mod: CPTII,S$GLB,, | Performed by: STUDENT IN AN ORGANIZED HEALTH CARE EDUCATION/TRAINING PROGRAM

## 2023-04-04 PROCEDURE — 3046F HEMOGLOBIN A1C LEVEL >9.0%: CPT | Mod: CPTII,S$GLB,, | Performed by: STUDENT IN AN ORGANIZED HEALTH CARE EDUCATION/TRAINING PROGRAM

## 2023-04-04 PROCEDURE — 99999 PR PBB SHADOW E&M-EST. PATIENT-LVL V: CPT | Mod: PBBFAC,,, | Performed by: STUDENT IN AN ORGANIZED HEALTH CARE EDUCATION/TRAINING PROGRAM

## 2023-04-04 PROCEDURE — 3075F PR MOST RECENT SYSTOLIC BLOOD PRESS GE 130-139MM HG: ICD-10-PCS | Mod: CPTII,S$GLB,, | Performed by: STUDENT IN AN ORGANIZED HEALTH CARE EDUCATION/TRAINING PROGRAM

## 2023-04-04 PROCEDURE — 99999 PR PBB SHADOW E&M-EST. PATIENT-LVL V: ICD-10-PCS | Mod: PBBFAC,,, | Performed by: STUDENT IN AN ORGANIZED HEALTH CARE EDUCATION/TRAINING PROGRAM

## 2023-04-04 PROCEDURE — 3008F PR BODY MASS INDEX (BMI) DOCUMENTED: ICD-10-PCS | Mod: CPTII,S$GLB,, | Performed by: STUDENT IN AN ORGANIZED HEALTH CARE EDUCATION/TRAINING PROGRAM

## 2023-04-04 PROCEDURE — 3075F SYST BP GE 130 - 139MM HG: CPT | Mod: CPTII,S$GLB,, | Performed by: STUDENT IN AN ORGANIZED HEALTH CARE EDUCATION/TRAINING PROGRAM

## 2023-04-04 NOTE — PROGRESS NOTES
"Subjective:      Patient ID: Lois Mckeon is a 17 y.o. female.    Chief Complaint: Myalgias  HPI    Lois Mckeon is a 17 y.o. female with type 1 diabetes, IBS, and HTN who presents for evaluation of transient myalgias.     He describes pain in his legs that he describes as a sensation "like [his] circulation was being cut off." He reports that the episodes approximately 5 years ago and later began to involve the legs, fingers, and wrist. Reports that the episodes involve the whole leg muscles in the upper and lower leg at times, but not the joints. These episodes sometimes weekly but at other times occurs a few times a month.  He denies joint swelling, rashes, oral ulcers, alopecia, raynaud's, no history of blood clots. He follows with endocrinology for male transitioning and is currently on testosterone. Family history significant for uncle with MS and mom with psoriasis. She does have chest and SOB daily but is following with cardiology for this.      Objective:   /81   Pulse (!) 114   Ht 5' 5" (1.651 m)   Wt 57 kg (125 lb 10.6 oz)   BMI 20.91 kg/m²   Physical Exam   Constitutional: No distress.   HENT:   Head: Normocephalic and atraumatic.   Eyes: Conjunctivae are normal.   Cardiovascular: Normal rate, regular rhythm and normal heart sounds. Exam reveals no friction rub.   No murmur heard.  Pulmonary/Chest: Effort normal and breath sounds normal. He has no wheezes. He has no rales.   Abdominal: Soft. Bowel sounds are normal. There is no abdominal tenderness. There is no rebound.   Musculoskeletal:      Comments: No synovitis in the upper or lower extremities.   Neurological: He is alert.   Skin: Skin is warm and dry. He is not diaphoretic.     No data to display     Assessment:     1. Myalgia      17-year-old transgender male presents for evaluation of transient episodic myalgias involving legs, wrist and hands for the past few years. No synovitis on exam. Low suspicion for low rheumatology disease. " CSI: 48. It is unclear if these transient myalgias represent paresthesias, though low suspicion, out of abundance of caution will refer to Neurology given family history of MS. Although low suspicion, will complete work-up with SONYA, CPK/aldolase, and inflammatory markers.    Plan:     Problem List Items Addressed This Visit    None  Visit Diagnoses       Myalgia    -  Primary    Relevant Orders    C-Reactive Protein (Completed)    Sedimentation rate (Completed)    Rheumatoid Factor (Completed)    Cyclic Citrullinated Peptide Antibody, IgG (Completed)    Comprehensive Metabolic Panel (Completed)    CBC Auto Differential (Completed)    CK (Completed)    Aldolase (Completed)    SONYA Screen w/Reflex (Completed)    Urinalysis    Protein/Creatinine Ratio, Urine    Ambulatory referral/consult to Neurology          - low suspicion for autoimmune disease  - will complete workup with SONYA, CPK, aldolase, RF, CCP, and inflammatory markers  - it is unclear if these transient myalgias represent paresthesias, though low suspicion, out of abundance of caution will refer to Neurology given family history of MS    Follow-up as needed    Patient seen and evaluated with Dr. Waters

## 2023-04-04 NOTE — PROGRESS NOTES
Rapid3 Question Responses and Scores 3/29/2023   MDHAQ Score 0   Psychologic Score 4.4   Pain Score 5   When you awakened in the morning OVER THE LAST WEEK, did you feel stiff? No   Fatigue Score 7   Global Health Score 2   RAPID3 Score 2.33     Answers submitted by the patient for this visit:  Rheumatology Questionnaire (Submitted on 3/29/2023)  fever: No  eye redness: No  mouth sores: No  headaches: Yes  shortness of breath: Yes  chest pain: Yes  trouble swallowing: No  diarrhea: No  constipation: Yes  unexpected weight change: No  genital sore: No  dysuria: No  During the last 3 days, have you had a skin rash?: No  Bruises or bleeds easily: No  cough: No

## 2023-04-04 NOTE — PROGRESS NOTES
Answers submitted by the patient for this visit:  Rheumatology Questionnaire (Submitted on 3/29/2023)  fever: No  eye redness: No  mouth sores: No  headaches: Yes  shortness of breath: Yes  chest pain: Yes  trouble swallowing: No  diarrhea: No  constipation: Yes  unexpected weight change: No  genital sore: No  dysuria: No  During the last 3 days, have you had a skin rash?: No  Bruises or bleeds easily: No  cough: No

## 2023-04-04 NOTE — PROGRESS NOTES
Answers submitted by the patient for this visit:  Rheumatology Questionnaire (Submitted on 3/29/2023)  fever: No  eye redness: No  mouth sores: No  headaches: Yes  shortness of breath: Yes  chest pain: Yes  trouble swallowing: No  diarrhea: No  constipation: Yes  unexpected weight change: No  genital sore: No  dysuria: No  During the last 3 days, have you had a skin rash?: No  Bruises or bleeds easily: No  cough: No  RHEUMATOLOGY ATTENDING:  Patient presented, personally interviewed and examined, medical records reviewed.  17 yr old transgender male sent for consultation on myalgia by his cardiologist.  Patient describes pain in different muscles of body that are intermittent. Denies paresthesias but has a maternal uncle who is in a NH w MS.   Co morbidities DMI, asthma, IBS-C, tachycardia, HTN & anxiety & persistent insomnia.  CSI = 48 = moderate  In an abundance of caution we will work up for rheumatic etiology, but our index of suspicion for such is low.  Findings discussed with patient and Dr. Carreon whose note and assessment I agree with.

## 2023-04-11 ENCOUNTER — TELEPHONE (OUTPATIENT)
Dept: NEUROLOGY | Facility: CLINIC | Age: 18
End: 2023-04-11
Payer: COMMERCIAL

## 2023-04-11 DIAGNOSIS — Z78.9 FEMALE-TO-MALE TRANSGENDER PERSON: ICD-10-CM

## 2023-04-11 RX ORDER — TESTOSTERONE CYPIONATE 200 MG/ML
INJECTION, SOLUTION INTRAMUSCULAR
Qty: 2 ML | Refills: 5 | Status: CANCELLED | OUTPATIENT
Start: 2023-04-11

## 2023-04-11 NOTE — TELEPHONE ENCOUNTER
Called and spoke with patient.  Patient was scheduled incorrectly.  I was able to get her scheduled with the correct department, General Neurology. Mrs Mckeon asked for Mon, Wed, Friday after 12;00.  Appt scheduled for May 17 @ 1;00

## 2023-04-13 ENCOUNTER — PATIENT MESSAGE (OUTPATIENT)
Dept: PEDIATRIC ENDOCRINOLOGY | Facility: CLINIC | Age: 18
End: 2023-04-13
Payer: COMMERCIAL

## 2023-04-14 DIAGNOSIS — Z78.9 FEMALE-TO-MALE TRANSGENDER PERSON: ICD-10-CM

## 2023-04-14 RX ORDER — TESTOSTERONE CYPIONATE 200 MG/ML
INJECTION, SOLUTION INTRAMUSCULAR
Qty: 4 ML | Refills: 5 | Status: SHIPPED | OUTPATIENT
Start: 2023-04-14

## 2023-04-21 ENCOUNTER — LAB VISIT (OUTPATIENT)
Dept: LAB | Facility: HOSPITAL | Age: 18
End: 2023-04-21
Payer: COMMERCIAL

## 2023-04-21 ENCOUNTER — OFFICE VISIT (OUTPATIENT)
Dept: NEUROLOGY | Facility: CLINIC | Age: 18
End: 2023-04-21
Payer: COMMERCIAL

## 2023-04-21 VITALS
HEART RATE: 116 BPM | SYSTOLIC BLOOD PRESSURE: 122 MMHG | BODY MASS INDEX: 19.47 KG/M2 | WEIGHT: 116.88 LBS | HEIGHT: 65 IN | DIASTOLIC BLOOD PRESSURE: 85 MMHG

## 2023-04-21 DIAGNOSIS — M79.18 MYALGIA, MULTIPLE SITES: ICD-10-CM

## 2023-04-21 DIAGNOSIS — M79.18 MYALGIA, MULTIPLE SITES: Primary | ICD-10-CM

## 2023-04-21 LAB
FERRITIN SERPL-MCNC: 99 NG/ML (ref 20–300)
FOLATE SERPL-MCNC: 14.4 NG/ML (ref 4–24)
IRON SERPL-MCNC: 67 UG/DL (ref 45–160)
SATURATED IRON: 23 % (ref 20–50)
TOTAL IRON BINDING CAPACITY: 293 UG/DL (ref 250–450)
TRANSFERRIN SERPL-MCNC: 198 MG/DL (ref 200–375)
VIT B12 SERPL-MCNC: 500 PG/ML (ref 210–950)

## 2023-04-21 PROCEDURE — 82607 VITAMIN B-12: CPT | Performed by: STUDENT IN AN ORGANIZED HEALTH CARE EDUCATION/TRAINING PROGRAM

## 2023-04-21 PROCEDURE — 84207 ASSAY OF VITAMIN B-6: CPT | Performed by: STUDENT IN AN ORGANIZED HEALTH CARE EDUCATION/TRAINING PROGRAM

## 2023-04-21 PROCEDURE — 84165 PROTEIN E-PHORESIS SERUM: CPT | Performed by: STUDENT IN AN ORGANIZED HEALTH CARE EDUCATION/TRAINING PROGRAM

## 2023-04-21 PROCEDURE — 84165 PATHOLOGIST INTERPRETATION SPE: ICD-10-PCS | Mod: 26,,, | Performed by: PATHOLOGY

## 2023-04-21 PROCEDURE — 82746 ASSAY OF FOLIC ACID SERUM: CPT | Performed by: STUDENT IN AN ORGANIZED HEALTH CARE EDUCATION/TRAINING PROGRAM

## 2023-04-21 PROCEDURE — 84425 ASSAY OF VITAMIN B-1: CPT | Performed by: STUDENT IN AN ORGANIZED HEALTH CARE EDUCATION/TRAINING PROGRAM

## 2023-04-21 PROCEDURE — 82728 ASSAY OF FERRITIN: CPT | Performed by: STUDENT IN AN ORGANIZED HEALTH CARE EDUCATION/TRAINING PROGRAM

## 2023-04-21 PROCEDURE — 99999 PR PBB SHADOW E&M-EST. PATIENT-LVL V: CPT | Mod: PBBFAC,,, | Performed by: STUDENT IN AN ORGANIZED HEALTH CARE EDUCATION/TRAINING PROGRAM

## 2023-04-21 PROCEDURE — 99999 PR PBB SHADOW E&M-EST. PATIENT-LVL V: ICD-10-PCS | Mod: PBBFAC,,, | Performed by: STUDENT IN AN ORGANIZED HEALTH CARE EDUCATION/TRAINING PROGRAM

## 2023-04-21 PROCEDURE — 84466 ASSAY OF TRANSFERRIN: CPT | Performed by: STUDENT IN AN ORGANIZED HEALTH CARE EDUCATION/TRAINING PROGRAM

## 2023-04-21 PROCEDURE — 84165 PROTEIN E-PHORESIS SERUM: CPT | Mod: 26,,, | Performed by: PATHOLOGY

## 2023-04-21 PROCEDURE — 83921 ORGANIC ACID SINGLE QUANT: CPT | Performed by: STUDENT IN AN ORGANIZED HEALTH CARE EDUCATION/TRAINING PROGRAM

## 2023-04-21 NOTE — PATIENT INSTRUCTIONS
Please have labs drawn as discussed during today's visit. Please schedule follow up visit in 3 months, and call with any additional questions or concerns.

## 2023-04-21 NOTE — PROGRESS NOTES
"[facility] - NEUROLOGY [address] [facility], [place]    Date: 4/21/23  Patient Name: Lois Mckeon   MRN: [id number]   PCP: Anat Durham  Referring Provider: Self, Aaareferral    Assessment:   Lois Mckeon is a 17 y.o. adult presenting for transgender male, undergoing testosterone therapy, w/ pmh of  type 1 diabetes, IBS, and HTN presenting due to a squeezing, intermittent pain in the hands, arms, face, and legs which occurs in "segments" skipping sections of the extremities, and not involving any joint except the wrists    Plan:     -Labs as ordered (B vitamins, MMA, SPEP, Iron studies).  -RTC in 3 months    Problem List Items Addressed This Visit          Other    Myalgia, multiple sites - Primary    Relevant Orders    VITAMIN B1    VITAMIN B12    VITAMIN B6    IRON AND TIBC    FERRITIN    FOLATE    METHYLMALONIC ACID, SERUM    PROTEIN ELECTROPHORESIS, SERUM       G. Patrick Patel MD    Patient note was created using MModal Dictation.  Any errors in syntax or even information may not have been identified and edited on initial review prior to signing this note.  Subjective:   Patient seen in consultation at the request of Self, Aaareferral for the evaluation of pain. A copy of this note will be sent to the referring physician.        HPI:   Mr. Lois Mckeon is a 17 y.o. transgender male, undergoing testosterone therapy, w/ pmh of  type 1 diabetes, IBS, and HTN presenting due to pain. Several weeks ago, was referred by cardiology (seen for tachycardia and daily CP and SOB) to rheumatology due to this pain, with lab workup unremarkable from their standpoint. The pain is described as feeling like "circulation is being cut off" or a squeezing sensation. It occurs in the hands, arms, face, and legs. It occurs in "segments" skipping sections of the extremities, and not involving any joint except the wrists. I t has been occurring on average 1x a week for about 5 years, but sometimes " occurs several days in a row, and other times can go weeks without occurring. It lasts varying amounts of time, from minutes to multiple days. It last occurred yesterday for several 10 minute periods. Nothing exacerbates or relieves the symptoms. Tylenol, naproxen, and ibuprofen have not helped. The patient began transitioning and receiving testosterone therapy several years ago, and states symptoms began around this time, but believes it was just prior to starting the treatments. The patient also complains of insomnia, and takes seroquel 700 mg qhs. Patient states seroquel was started only about 2 years ago, long after presenting symptoms began. Of note, the patient does have an uncle with MS, but denies any additional neurologic symptoms during lifetime. Patient also complains of daily chest pain and SOB (has seen cardiology as mentioned).          PAST MEDICAL HISTORY:  Past Medical History:   Diagnosis Date    Asthma     Diabetes mellitus     IBS (irritable bowel syndrome)        PAST SURGICAL HISTORY:  No past surgical history on file.    CURRENT MEDS:  Current Outpatient Medications   Medication Sig Dispense Refill    ACCU-CHEK GUIDE GLUCOSE METER Misc Use as directed to measure blood glucose levels daily 1 each 0    ACCU-CHEK GUIDE TEST STRIPS Strp USE AS DIRECTED TO TEST BLOOD GLUCOSE LEVELS UP TO 6 TIMES A  strip 4    ACETAMINOPHEN (TYLENOL ORAL) Take by mouth as needed.      acetone, urine, test (CHEMSTRIP K) Strp Please use as directed to test for urine ketones with blood sugar >250 mg/dL or patient is ill 100 strip 4    cyproheptadine (PERIACTIN) 4 mg tablet TAKE 1 TABLET BY MOUTH TWICE A DAY (Patient taking differently: Take 8 mg by mouth every evening.) 60 tablet 6    EASY TOUCH ALCOHOL PREP PADS PadM USE BEFORE AND AFTER TESTING 10 TIMES DAILY 300 each 0    glucagon (BAQSIMI) 3 mg/actuation Spry 1 spray by Nasal route as needed (hypoglycemia). 2 each 2    hydrOXYzine (VISTARIL) 25 mg/mL  "injection       hydrOXYzine pamoate (VISTARIL) 25 MG Cap Take 25 mg by mouth nightly as needed.      insulin (LANTUS SOLOSTAR U-100 INSULIN) glargine 100 units/mL SubQ pen Inject 17 units daily as directed. Give if pump failure or pump holiday. 6 mL 2    insulin aspart U-100 (NOVOLOG U-100 INSULIN ASPART) 100 unit/mL injection ADMINISTER 200 UNITS VIA INSULIN PUMP EVERY 3 DAYS AS DIRECTED 30 mL 3    ketone blood test (PRECISION XTRA B-KETONE) Strp USE AS DIRECTED FOR BLOOD KETONE TESTING 30 strip 4    lancets (ONETOUCH DELICA LANCETS) 33 gauge Misc 1 lancet by Misc.(Non-Drug; Combo Route) route as needed (patient to test 10 times daily). Patient testing 10 times daily 300 each 3    needle, disp, 21 G 21 gauge x 1" Ndle Use as directed to draw up testosterone 30 each 2    nystatin (MYCOSTATIN) 100,000 unit/mL suspension Take by mouth.      QUEtiapine (SEROQUEL) 300 MG Tab Take 300 mg by mouth nightly.      QUEtiapine (SEROQUEL) 400 MG tablet TAKE 1 TABLET BY MOUTH AT BEDTIME FOR 30 DAYS      syringe, disposable, 1 mL Syrg Use as directed to inject testosterone 30 Syringe 3    testosterone cypionate (DEPOTESTOTERONE CYPIONATE) 200 mg/mL injection INJECT 0.5 ML (100 MG TOTAL) INTO THE MUSCLE ONCE A WEEK. 4 mL 5    urine glucose-ketones test (KETO-DIASTIX) Strp Check urine ketones when BG>300 100 strip 3     No current facility-administered medications for this visit.       ALLERGIES:  Review of patient's allergies indicates:   Allergen Reactions    Lactose Other (See Comments)    Silver Itching     Allergic to fake jewerly crusted skin    Banana Itching and Rash    Cantaloupe Itching and Rash    Grass pollen-june grass standard Itching and Rash    Kiwi (actinidia chinensis) Itching and Rash    Melon Itching and Rash    Pineapple Itching and Rash    Tomato (solanum lycopersicum) Itching and Rash       FAMILY HISTORY:  Family History   Problem Relation Age of Onset    Ulcers Mother     Psoriasis Mother     Rheum " "arthritis Father     Diabetes Brother         Type 1 diabetes    Rheum arthritis Maternal Uncle        SOCIAL HISTORY:  Social History     Tobacco Use    Smoking status: Never    Smokeless tobacco: Never   Substance Use Topics    Alcohol use: No       Review of Systems:  12 system review of systems is negative except for the symptoms mentioned in HPI.      Objective:     Vitals:    04/21/23 1346   BP: 122/85   Pulse: (!) 116   Weight: 53 kg (116 lb 13.5 oz)   Height: 5' 5" (1.651 m)     General: NAD, well nourished   Eyes: no tearing, discharge, no erythema   ENT: moist mucous membranes of the oral cavity, nares patent    Neck: Supple, full range of motion  Cardiovascular: Warm and well perfused, pulses equal and symmetrical  Lungs: Normal work of breathing, normal chest wall excursions  Skin: No rash, lesions, or breakdown on exposed skin  Psychiatry: Mood and affect are appropriate   Abdomen: soft, non tender, non distended  Extremeties: No cyanosis, clubbing or edema.    Neurological   MENTAL STATUS: Alert and oriented to person, place, and time. Attention and concentration within normal limits. Speech without dysarthria, able to name and repeat without difficulty. Recent and remote memory within normal limits   CRANIAL NERVES: Visual fields intact. PERRL. EOMI. Facial sensation intact. Face symmetrical. Hearing grossly intact. Full shoulder shrug bilaterally. Tongue protrudes midline   SENSORY: Sensation is intact to light touch and vibration throughout.  Joint position perception intact. Negative Romberg.   MOTOR: Normal bulk and tone. No pronator drift.  5/5 deltoid, biceps, triceps, interosseous, hand  bilaterally. 5/5 iliopsoas, knee extension/flexion, foot dorsi/plantarflexion bilaterally.    REFLEXES: Symmetric and 2+ throughout. Toes down going bilaterally.   CEREBELLAR/COORDINATION/GAIT: Gait steady with normal arm swing and stride length. Normal rapid alternating movements.        "

## 2023-04-23 NOTE — TELEPHONE ENCOUNTER
Refill Decision Note   Luis Wong  is requesting a refill authorization.  Brief Assessment and Rationale for Refill:  Approve     Medication Therapy Plan:       Medication Reconciliation Completed: No    Comments:     No Care Gaps recommended.     Note composed:1:06 AM 04/23/2023             Spoke c pt's mother.     Dr. Matute letter was misplaced.     Letter reprinted & emailed to pt's mother @ [email or web address]

## 2023-04-24 LAB
ALBUMIN SERPL ELPH-MCNC: 4.63 G/DL (ref 3.35–5.55)
ALPHA1 GLOB SERPL ELPH-MCNC: 0.24 G/DL (ref 0.17–0.41)
ALPHA2 GLOB SERPL ELPH-MCNC: 0.66 G/DL (ref 0.43–0.99)
B-GLOBULIN SERPL ELPH-MCNC: 0.77 G/DL (ref 0.5–1.1)
GAMMA GLOB SERPL ELPH-MCNC: 1.6 G/DL (ref 0.67–1.58)
PATHOLOGIST INTERPRETATION SPE: NORMAL
PROT SERPL-MCNC: 7.9 G/DL (ref 6–8.4)

## 2023-04-25 LAB
METHYLMALONATE SERPL-SCNC: <0.1 UMOL/L
PYRIDOXAL SERPL-MCNC: 8 UG/L (ref 5–50)

## 2023-04-26 LAB — VIT B1 BLD-MCNC: 65 UG/L (ref 38–122)

## 2023-04-27 RX ORDER — DEXTROSE 4 G
TABLET,CHEWABLE ORAL
Qty: 1 EACH | Refills: 0 | Status: SHIPPED | OUTPATIENT
Start: 2023-04-27

## 2023-04-27 NOTE — TELEPHONE ENCOUNTER
Returned call. Patient states that they are having trouble with their Accu-check meter. Every time they try to test it gives an error message. Patient recently got new insurance and would like to see if they can get a different meter with the new insurance as they have had this problem with Accu-check before. Advised that I would send prescription request for Misc meter and test strips so pharmacy can help fill the brand covered by their insurance. Also assisted with scheduling f/u appt as patient missed last appt. Patient verbalized understanding.     ----- Message from Kate Stanley sent at 4/27/2023 12:56 PM CDT -----    Patient Returning Call        Who Called:pt  Does the patient know what this is regarding?:need a blood sugar meter   Would the patient rather a call back or a response via [facility]? call  Best Call Back Number:[phone]  Additional Information: call back

## 2023-05-09 ENCOUNTER — OFFICE VISIT (OUTPATIENT)
Dept: PEDIATRIC ENDOCRINOLOGY | Facility: CLINIC | Age: 18
End: 2023-05-09
Payer: COMMERCIAL

## 2023-05-09 VITALS
HEIGHT: 65 IN | DIASTOLIC BLOOD PRESSURE: 75 MMHG | HEART RATE: 136 BPM | BODY MASS INDEX: 19.23 KG/M2 | SYSTOLIC BLOOD PRESSURE: 121 MMHG | WEIGHT: 115.44 LBS

## 2023-05-09 DIAGNOSIS — Z96.41 INSULIN PUMP STATUS: ICD-10-CM

## 2023-05-09 DIAGNOSIS — E10.65 UNCONTROLLED TYPE 1 DIABETES MELLITUS WITH HYPERGLYCEMIA: Primary | ICD-10-CM

## 2023-05-09 DIAGNOSIS — Z46.81 INSULIN PUMP TITRATION: ICD-10-CM

## 2023-05-09 DIAGNOSIS — Z91.148 NON COMPLIANCE W MEDICATION REGIMEN: ICD-10-CM

## 2023-05-09 PROBLEM — F20.9: Status: ACTIVE | Noted: 2021-04-26

## 2023-05-09 PROBLEM — L70.9 ACNE: Status: ACTIVE | Noted: 2017-08-18

## 2023-05-09 PROBLEM — J45.909 ASTHMA: Status: ACTIVE | Noted: 2017-08-28

## 2023-05-09 PROBLEM — H53.8 FLASHING LIGHTS: Status: ACTIVE | Noted: 2020-09-03

## 2023-05-09 PROBLEM — F64.2 GENDER DYSPHORIA IN PEDIATRIC PATIENT: Status: ACTIVE | Noted: 2021-04-26

## 2023-05-09 PROBLEM — H52.10 MYOPIA: Status: ACTIVE | Noted: 2017-08-18

## 2023-05-09 PROBLEM — F41.9 ANXIETY DISORDER: Status: ACTIVE | Noted: 2019-05-31

## 2023-05-09 LAB — HBA1C MFR BLD: 10.7 % (ref 4–6.4)

## 2023-05-09 PROCEDURE — 3074F SYST BP LT 130 MM HG: CPT | Mod: CPTII,S$GLB,, | Performed by: NURSE PRACTITIONER

## 2023-05-09 PROCEDURE — 3008F PR BODY MASS INDEX (BMI) DOCUMENTED: ICD-10-PCS | Mod: CPTII,S$GLB,, | Performed by: NURSE PRACTITIONER

## 2023-05-09 PROCEDURE — 99999 PR PBB SHADOW E&M-EST. PATIENT-LVL IV: ICD-10-PCS | Mod: PBBFAC,,, | Performed by: NURSE PRACTITIONER

## 2023-05-09 PROCEDURE — 3078F PR MOST RECENT DIASTOLIC BLOOD PRESSURE < 80 MM HG: ICD-10-PCS | Mod: CPTII,S$GLB,, | Performed by: NURSE PRACTITIONER

## 2023-05-09 PROCEDURE — 3008F BODY MASS INDEX DOCD: CPT | Mod: CPTII,S$GLB,, | Performed by: NURSE PRACTITIONER

## 2023-05-09 PROCEDURE — 1159F PR MEDICATION LIST DOCUMENTED IN MEDICAL RECORD: ICD-10-PCS | Mod: CPTII,S$GLB,, | Performed by: NURSE PRACTITIONER

## 2023-05-09 PROCEDURE — 83036 POCT HEMOGLOBIN A1C: ICD-10-PCS | Mod: QW,S$GLB,, | Performed by: NURSE PRACTITIONER

## 2023-05-09 PROCEDURE — 1160F PR REVIEW ALL MEDS BY PRESCRIBER/CLIN PHARMACIST DOCUMENTED: ICD-10-PCS | Mod: CPTII,S$GLB,, | Performed by: NURSE PRACTITIONER

## 2023-05-09 PROCEDURE — 99999 PR PBB SHADOW E&M-EST. PATIENT-LVL IV: CPT | Mod: PBBFAC,,, | Performed by: NURSE PRACTITIONER

## 2023-05-09 PROCEDURE — 1160F RVW MEDS BY RX/DR IN RCRD: CPT | Mod: CPTII,S$GLB,, | Performed by: NURSE PRACTITIONER

## 2023-05-09 PROCEDURE — 1159F MED LIST DOCD IN RCRD: CPT | Mod: CPTII,S$GLB,, | Performed by: NURSE PRACTITIONER

## 2023-05-09 PROCEDURE — 3074F PR MOST RECENT SYSTOLIC BLOOD PRESSURE < 130 MM HG: ICD-10-PCS | Mod: CPTII,S$GLB,, | Performed by: NURSE PRACTITIONER

## 2023-05-09 PROCEDURE — 3046F HEMOGLOBIN A1C LEVEL >9.0%: CPT | Mod: CPTII,S$GLB,, | Performed by: NURSE PRACTITIONER

## 2023-05-09 PROCEDURE — 3078F DIAST BP <80 MM HG: CPT | Mod: CPTII,S$GLB,, | Performed by: NURSE PRACTITIONER

## 2023-05-09 PROCEDURE — 99215 PR OFFICE/OUTPT VISIT, EST, LEVL V, 40-54 MIN: ICD-10-PCS | Mod: S$GLB,,, | Performed by: NURSE PRACTITIONER

## 2023-05-09 PROCEDURE — 83036 HEMOGLOBIN GLYCOSYLATED A1C: CPT | Mod: QW,S$GLB,, | Performed by: NURSE PRACTITIONER

## 2023-05-09 PROCEDURE — 99215 OFFICE O/P EST HI 40 MIN: CPT | Mod: S$GLB,,, | Performed by: NURSE PRACTITIONER

## 2023-05-09 PROCEDURE — 3046F PR MOST RECENT HEMOGLOBIN A1C LEVEL > 9.0%: ICD-10-PCS | Mod: CPTII,S$GLB,, | Performed by: NURSE PRACTITIONER

## 2023-05-09 NOTE — PROGRESS NOTES
"Nish Mckeon is a 17 y.o. being seen in the pediatric endocrinology clinic today in follow up for type 1 diabetes mellitus.     Nish was diagnosed with type 1 diabetes in 3/2009. He was last seen in our endocrine clinic for a diabetes visit in November 2022.  Nish identifies as a transgender male and started on hormone therapy with testosterone in August 2019. He was last seen in gender clinic in September 2022. Nish prefers the use of male pronouns (he/him).  He sees mental health providers, Dr. Shook and Jean Marie Sorto.    Interval History Diabetes:   Nish was last seen in our pediatric endocrine clinic in February 2023. He is currently on CSII using Tandem TSlim, started on 11/23/2021. Previously using the [employer] 630 pump. He has the Dexcom CGM but has not had it on for the past couple of months so there is no control IQ in use. He states he has a new Dexcom now and needs to put it on. He took off the dexcom because "it was alarming too many lows".    Nish reports his glucose levels have been higher lately and he doesn't feel that he is high except has noted increase in urination. He reports having ketones once since his last visit.     Since his last visit he was seen by rheumatology and neurology  for myalgias. He states labs were done at both visit and did not show any known cause of symptoms. He went to ophthalmology in March and found to have a retinal tear, had laser treatment. No diabetic retinopathy.    Reviewed rheumatology and neurology clinic notes.   He continues to have oral thrush and sees a dentist for gum disease.    Glucose Data:  Meter: True Metrix (new meter)  Meter review in office shows glucose levels between 281-489 mg/dl.    Pump Data:        Interpretation: Many days with no glucose entries, most days entering 1 glucose reading. No use of Control IQ. Little response in glucose after correction bolus.    Nish is having rare episodes of hypoglycemia per week. None in recent meter data. " "Associated symptoms of hypoglycemia are  weak and shaky . He reports symptoms of hyperglycemia such as polyuria.     Infusion sites: arm(s).     Insulin Instructions  Pump Settings   insulin aspart U-100 100 unit/mL injection (NovoLOG)   Last edited by Abbie Fernandez MD on 10/27/2022 at 5:14 PM      Basal Rate   Total Basal Dose: 14.4 units/day   Time units/hr   12:00 AM 0.6    4:00 AM 0.6      Blood Glucose Target   Time mg/dL   12:00  - 120    9:00  - 150      Sensitivity Factor   Time mg/dL/unit   12:00 AM 60      Carb Ratio   Time g/unit   12:00 AM 10     Nutrition/Exercise:  Nutrition: carb counting but is not on a specified limit    Review of growth chart shows: 5 lb weight loss since last visit    Exercise: no physical activity    Review of Systems:  Constitutional: Negative for fever.   HENT: Negative for congestion and sore throat.  + oral thrush  Eyes: retinal tear, see above in HPI, f/u next month  Respiratory: Negative for cough and shortness of breath.    Cardiovascular: Negative for chest pain, + palpitations  Gastrointestinal: + for nausea, constipation, and abdominal pain intermittently   Musculoskeletal: + for myalgias.   Skin: Negative for rash.   Neurological: Negative for headaches.   Psychiatric/Behavioral: Negative for behavioral problems. +anxiety, Difficulty falling asleep.    Past Medical/Family/Surgical History:  I have reviewed, and verified the past medical, surgical, and family history and updated as appropriate. No changes reported.    Social History:  He is at [employer], just finished semester and passed. Will be attending summer semester.  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    Meds:  Reviewed and reconciled.     Physical Exam:  /75 (BP Location: Left arm)   Pulse (!) 136   Ht 5' 5.28" (1.658 m)   Wt 52.3 kg (115 lb 6.6 oz)   BMI 19.04 kg/m²    General: alert, actively engaged in visit  HEENT: mucous membranes moist, no erythema, + thrush to tongue  Injection Sites: " normal to upper arms, hard nodular areas on abdomen at previous infusion sites  Eyes:  Conjunctivae are normal  Lungs: Effort normal, breath sounds clear.  Cardiovascular: Regular rhythm, + tachycardia, no murmur  Abdomen: soft, non-tender to palpation, non-distended. No hepatomegaly palpated.    Labs:  Hemoglobin A1C   Date Value Ref Range Status   11/16/2022 8.4 (H) 4.0 - 5.6 % Final     Comment:     ADA Screening Guidelines:  5.7-6.4%  Consistent with prediabetes  >or=6.5%  Consistent with diabetes    High levels of fetal hemoglobin interfere with the HbA1C  assay. Heterozygous hemoglobin variants (HbS, HgC, etc)do  not significantly interfere with this assay.   However, presence of multiple variants may affect accuracy.     07/27/2022 8.6 (H) 4.0 - 5.6 % Final     Comment:     ADA Screening Guidelines:  5.7-6.4%  Consistent with prediabetes  >or=6.5%  Consistent with diabetes    High levels of fetal hemoglobin interfere with the HbA1C  assay. Heterozygous hemoglobin variants (HbS, HgC, etc)do  not significantly interfere with this assay.   However, presence of multiple variants may affect accuracy.     03/16/2022 9.0 (H) 4.0 - 5.6 % Final     Comment:     ADA Screening Guidelines:  5.7-6.4%  Consistent with prediabetes  >or=6.5%  Consistent with diabetes    High levels of fetal hemoglobin interfere with the HbA1C  assay. Heterozygous hemoglobin variants (HbS, HgC, etc)do  not significantly interfere with this assay.   However, presence of multiple variants may affect accuracy.         Last Screening tests:    Lab Results   Component Value Date    TSH 1.770 12/22/2022     Microalbumin  Lab Results   Component Value Date    LABMICR 22.0 11/21/2022    CREATRANDUR 157.7 12/22/2022    MICALBCREAT Unable to calculate 11/21/2022       Component      Latest Ref Rng & Units 4/9/2021   TTG IgA      <20 UNITS 6   IgA      40 - 350 mg/dL 178     Component      Latest Ref Rng & Units 7/27/2022   Cholesterol      120 - 199  mg/dL 192   Triglycerides      30 - 150 mg/dL 128   HDL      40 - 75 mg/dL 54   LDL Cholesterol External      63.0 - 159.0 mg/dL 112.4   HDL/Cholesterol Ratio      20.0 - 50.0 % 28.1   Total Cholesterol/HDL Ratio      2.0 - 5.0 3.6   Non-HDL Cholesterol      mg/dL 138     Eye Exam: Dr. Alonzo Mireles - [facility], last seen 3/31/2023    Gender Dysphoria  He is on testosterone 100 mg weekly. He gets the injections on Thursdays.     Assessment/Plan:  Nish is a 17 y.o. with T1D of 14 yrs 2 months duration. Nish has gender dysphoria, history of anxiety, depression, visual and auditory hallucinations. A1c has increased since the last visit, up from 9.7%.     Component      Latest Ref Rng & Units 5/9/2023   Hemoglobin A1C, POC      4 - 6.4 % 10.7 (A)     Nish's diabetes is under very poor control. His A1C is very elevated and has been increasing over the past few visits. He has not been wearing CGM consistently and not using Control IQ program on his pump.  There is poor consistency with bolusing and giving correction. Without automated pump delivery he is getting insufficient insulin dosing to keep his glucose levels in good range.     His blood sugars and insulin settings were reviewed for the past four weeks. I reviewed and adjusted insulin dose: adjusted the ISF. There is very limited data so no other adjustments were made today. I discussed with Nish the importance of improving his glycemic control. He is at high risk for complications associated with his poor control. He assured me he will restart the Dexcom CGM today. We will plan to review his pump data in 1 week. Repaired his pump and downloaded the [employer] mobile tevin so I can see the data. He agreed to this plan.    Screening tests:  Depression screen: administered PHQ-9 modified for adolescents today, 2/15/2023. Total score: 4, 0 for question #9.  Lipid panel screening - recommended in 3 years if normal, LDL goal <100: Due July  2023  Thyroid screening annually - Due 12/2023  Celiac screen - baseline and 2 yrs after DM diagnosis: last checked 4/2021- normal  Eye Exam: Every 1-2 years after 5 years of DM duration (if under good control): Due July 2023  Comprehensive Foot Exam: Annually after 5 years of DM duration:  Due 11/2023  Microablumin/creatinine ratio: Annually after 5 yrs of DM duration: Due 11/2023    Labs today: POC A1C    Follow up in 6 weeks with NP for review of pump and CGM data as well as compliance with diabetes tasks.  Follow up with Dr. Fernandez in 4 months.    It was a pleasure seeing your patient in our clinic today. Thank you for allowing us to participate in his care.         TAMIKO Huddleston, CPNP  Pediatric Endocrinology    Total time spent on encounter (visit, lab/imaging review, documentation): 48 minutes

## 2023-05-09 NOTE — PATIENT INSTRUCTIONS
Insulin Instructions  Pump Settings   insulin aspart U-100 100 unit/mL injection (NovoLOG)   Last edited by Liliana Johnson NP on 5/9/2023 at 1:50 PM      Basal Rate   Total Basal Dose: 14.4 units/day   Time units/hr   12:00 AM 0.6    4:00 AM 0.6      Blood Glucose Target   Time mg/dL   12:00  - 120    9:00  - 150      Sensitivity Factor   Time mg/dL/unit   12:00 AM 55      Carb Ratio   Time g/unit   12:00 AM 10      Follow up in 4-6 weeks.

## 2023-05-15 ENCOUNTER — PATIENT MESSAGE (OUTPATIENT)
Dept: PEDIATRIC ENDOCRINOLOGY | Facility: CLINIC | Age: 18
End: 2023-05-15
Payer: COMMERCIAL

## 2023-05-15 DIAGNOSIS — Z78.9 FEMALE-TO-MALE TRANSGENDER PERSON: Primary | ICD-10-CM

## 2023-06-08 ENCOUNTER — TELEPHONE (OUTPATIENT)
Dept: PEDIATRIC ENDOCRINOLOGY | Facility: CLINIC | Age: 18
End: 2023-06-08
Payer: COMMERCIAL

## 2023-06-08 NOTE — TELEPHONE ENCOUNTER
Phoned patient due to concerns for persistent severe hyperglycemia. He states that glucose levels have been very high. He changed infusion site last evening but levels remained high today. He changed the site again earlier today and used a new insulin vial. Reviewed Tandem data for the past 72 hours. He denies any nausea or vomiting, checked for ketones and were small.  Advised to give correction using insulin and syringe now. He states last bolus was ~ 3 hrs ago. If glucose comes down he can continue to use pump for bolusing. If glucose comes down then goes back up he should change the site again and give correction with syringe and needle again. If there continues to be issues with hyperglycemia he should contact on call provider this evening.        Will follow up tomorrow.       Liliana MORRISON, REJINP  Pediatric Endocrinology

## 2023-06-08 NOTE — TELEPHONE ENCOUNTER
----- Message from Josiah Herrera sent at 6/8/2023  2:12 PM CDT -----  Who Called: Patient         What is the reqeust in detail: Requesting call back to discuss pt states his blood sugar at one point went above 400 and it is not going all the way down, pt will take insulin and it would go down but right back up, it will not go below 400. Pt also is changing his pump with new insulin to see if that is the issue. Please call pt.          Can the clinic reply by [facility]? No         Best Call Back Number: [phone]           Additional Information:

## 2023-06-09 ENCOUNTER — PATIENT OUTREACH (OUTPATIENT)
Dept: PEDIATRIC ENDOCRINOLOGY | Facility: CLINIC | Age: 18
End: 2023-06-09
Payer: COMMERCIAL

## 2023-06-09 NOTE — PROGRESS NOTES
Called Nish to check in after report of high blood sugars yesterday. Nish reported that his glucoses continued to be high most of the night. He gave an additional 6 units via injection this morning. He has continued to wear his pump. He denies any pump alarms or occlusions. He is using the [employer]oft infusion sets on his arms. He reports he changed his infusion set twice yesterday. He reports that after he gave the 6 units this morning his glucose decreased to 302 but is now headed up again after eating crackers and giving carb coverage. He checked his ketones this morning and they were trace. He denies any nausea, vomiting, or abdominal pain and reports that he actually feels well. Current glucose is 381. Recommended giving correction dose of 5 units via injection and changing infusion set again but trying a new site, either his abdomen or upper buttocks. Encouraged him to continue to check for ketones and drink lots of water. Recommended that he monitor his glucoses very closely and to call with any questions or concerns. Provided on call phone number in case he needs to call this weekend. He expressed understanding.

## 2023-06-14 ENCOUNTER — OFFICE VISIT (OUTPATIENT)
Dept: PEDIATRIC ENDOCRINOLOGY | Facility: CLINIC | Age: 18
End: 2023-06-14
Payer: COMMERCIAL

## 2023-06-14 VITALS
HEART RATE: 130 BPM | BODY MASS INDEX: 19.6 KG/M2 | SYSTOLIC BLOOD PRESSURE: 132 MMHG | WEIGHT: 117.63 LBS | HEIGHT: 65 IN | DIASTOLIC BLOOD PRESSURE: 77 MMHG

## 2023-06-14 DIAGNOSIS — Z46.81 INSULIN PUMP TITRATION: ICD-10-CM

## 2023-06-14 DIAGNOSIS — E10.65 UNCONTROLLED TYPE 1 DIABETES MELLITUS WITH HYPERGLYCEMIA: Primary | ICD-10-CM

## 2023-06-14 PROCEDURE — 3008F BODY MASS INDEX DOCD: CPT | Mod: CPTII,S$GLB,, | Performed by: NURSE PRACTITIONER

## 2023-06-14 PROCEDURE — 3075F SYST BP GE 130 - 139MM HG: CPT | Mod: CPTII,S$GLB,, | Performed by: NURSE PRACTITIONER

## 2023-06-14 PROCEDURE — 3075F PR MOST RECENT SYSTOLIC BLOOD PRESS GE 130-139MM HG: ICD-10-PCS | Mod: CPTII,S$GLB,, | Performed by: NURSE PRACTITIONER

## 2023-06-14 PROCEDURE — 3046F PR MOST RECENT HEMOGLOBIN A1C LEVEL > 9.0%: ICD-10-PCS | Mod: CPTII,S$GLB,, | Performed by: NURSE PRACTITIONER

## 2023-06-14 PROCEDURE — 1160F RVW MEDS BY RX/DR IN RCRD: CPT | Mod: CPTII,S$GLB,, | Performed by: NURSE PRACTITIONER

## 2023-06-14 PROCEDURE — 1160F PR REVIEW ALL MEDS BY PRESCRIBER/CLIN PHARMACIST DOCUMENTED: ICD-10-PCS | Mod: CPTII,S$GLB,, | Performed by: NURSE PRACTITIONER

## 2023-06-14 PROCEDURE — 99999 PR PBB SHADOW E&M-EST. PATIENT-LVL IV: CPT | Mod: PBBFAC,,, | Performed by: NURSE PRACTITIONER

## 2023-06-14 PROCEDURE — 99999 PR PBB SHADOW E&M-EST. PATIENT-LVL IV: ICD-10-PCS | Mod: PBBFAC,,, | Performed by: NURSE PRACTITIONER

## 2023-06-14 PROCEDURE — 3008F PR BODY MASS INDEX (BMI) DOCUMENTED: ICD-10-PCS | Mod: CPTII,S$GLB,, | Performed by: NURSE PRACTITIONER

## 2023-06-14 PROCEDURE — 3046F HEMOGLOBIN A1C LEVEL >9.0%: CPT | Mod: CPTII,S$GLB,, | Performed by: NURSE PRACTITIONER

## 2023-06-14 PROCEDURE — 95251 CONT GLUC MNTR ANALYSIS I&R: CPT | Mod: S$GLB,,, | Performed by: NURSE PRACTITIONER

## 2023-06-14 PROCEDURE — 1159F MED LIST DOCD IN RCRD: CPT | Mod: CPTII,S$GLB,, | Performed by: NURSE PRACTITIONER

## 2023-06-14 PROCEDURE — 3078F PR MOST RECENT DIASTOLIC BLOOD PRESSURE < 80 MM HG: ICD-10-PCS | Mod: CPTII,S$GLB,, | Performed by: NURSE PRACTITIONER

## 2023-06-14 PROCEDURE — 3078F DIAST BP <80 MM HG: CPT | Mod: CPTII,S$GLB,, | Performed by: NURSE PRACTITIONER

## 2023-06-14 PROCEDURE — 95251 PR GLUCOSE MONITOR, 72 HOUR, PHYS INTERP: ICD-10-PCS | Mod: S$GLB,,, | Performed by: NURSE PRACTITIONER

## 2023-06-14 PROCEDURE — 1159F PR MEDICATION LIST DOCUMENTED IN MEDICAL RECORD: ICD-10-PCS | Mod: CPTII,S$GLB,, | Performed by: NURSE PRACTITIONER

## 2023-06-14 PROCEDURE — 99214 PR OFFICE/OUTPT VISIT, EST, LEVL IV, 30-39 MIN: ICD-10-PCS | Mod: S$GLB,,, | Performed by: NURSE PRACTITIONER

## 2023-06-14 PROCEDURE — 99214 OFFICE O/P EST MOD 30 MIN: CPT | Mod: S$GLB,,, | Performed by: NURSE PRACTITIONER

## 2023-06-14 RX ORDER — QUETIAPINE FUMARATE 300 MG/1
300 TABLET, FILM COATED ORAL NIGHTLY
COMMUNITY
Start: 2023-05-23

## 2023-06-14 RX ORDER — HYDROXYZINE PAMOATE 50 MG/1
50 CAPSULE ORAL NIGHTLY PRN
COMMUNITY
Start: 2023-05-24

## 2023-06-14 NOTE — PROGRESS NOTES
Nish Mckeon is a 17 y.o. being seen in the pediatric endocrinology clinic today in follow up for type 1 diabetes mellitus.     Nish was diagnosed with type 1 diabetes in 3/2009. He was last seen in our endocrine clinic for a diabetes visit in November 2022.  Nish identifies as a transgender male and started on hormone therapy with testosterone in August 2019. He was last seen in gender clinic in September 2022. Nish prefers the use of male pronouns (he/him).  He sees mental health providers, Dr. Shook and Jean Marie Sorto.    Interval History Diabetes:   Nish was last seen in our pediatric endocrine clinic in May 2023. Today's visit is to review pump data after restarting the Dexcom and using automated mode. Last visit 0% use of control IQ program. He is on CSII using Tandem TSlim, started on 11/23/2021. Previously using the [employer] 630 pump. He is wearing Dexcom G6 CGM.      Nish had some issues last week with persistent hyperglycemia in the 400s. He required subq injections to be given and changed infusion sites. Glucose levels have subsequently improved with change of site to upper thighs, previously using his arm. He states the site on the left arm was infected and there was purulent discharge.     He has been seen by rheumatology and neurology for myalgias. Labs were done at both visits and did not show any known cause of symptoms. He went to ophthalmology in March and found to have a retinal tear, had laser treatment. No diabetic retinopathy.    Glucose Data:  Meter: True Metrix (new meter)      Pump Data:        Interpretation: Hyperglycemia most of the day with little time spent in target range. Prandial hyperglycemia followed by several auto-corrections, little response in glucose after auto-corrections. Several days with no insulin delivery for hours due to pump without insulin.    Nish is having rare episodes of hypoglycemia per week. None in recent meter data. Associated symptoms of hypoglycemia are  weak and  "shaky . He reports symptoms of hyperglycemia such as polyuria, headaches, and nausea, decreased appetite.     Infusion sites: arm(s) and thigh(s).     Insulin Instructions  Pump Settings   insulin aspart U-100 100 unit/mL injection (NovoLOG)   Last edited by Liliana Johnson NP on 5/9/2023 at 1:50 PM      Basal Rate   Total Basal Dose: 14.4 units/day   Time units/hr   12:00 AM 0.6    4:00 AM 0.6      Blood Glucose Target   Time mg/dL   12:00  - 120    9:00  - 150      Sensitivity Factor   Time mg/dL/unit   12:00 AM 55      Carb Ratio   Time g/unit   12:00 AM 10     Nutrition/Exercise:  Nutrition: carb counting but is not on a specified limit, 1-2 carb entries daily    Review of growth chart shows: 2 lb weight loss since last visit    Exercise: no physical activity    Review of Systems:  Constitutional: Negative for fever.   HENT: Negative for congestion and sore throat.  + oral thrush  Eyes: retinal tear, see above in HPI, f/u next month  Respiratory: Negative for cough and shortness of breath.    Cardiovascular: Negative for chest pain, + palpitations  Gastrointestinal: + for nausea, constipation, and abdominal pain intermittently   Musculoskeletal: + for myalgias.   Skin: Negative for rash.   Neurological: Negative for headaches.   Psychiatric/Behavioral: Negative for behavioral problems. +anxiety, Difficulty falling asleep.    Past Medical/Family/Surgical History:  I have reviewed, and verified the past medical, surgical, and family history and updated as appropriate. No changes reported.    Social History:  He is at [employer], just finished semester and passed. Will be attending summer semester.  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    Meds:  Reviewed and reconciled.     Physical Exam:  /77 (BP Location: Left arm)   Pulse (!) 130   Ht 5' 4.96" (1.65 m)   Wt 53.4 kg (117 lb 9.9 oz)   BMI 19.60 kg/m²    General: alert, actively engaged in visit  HEENT: mucous membranes moist, no erythema, + thrush to " tongue  Injection Sites: normal to upper arms, hard nodular areas on abdomen at previous infusion sites  Eyes:  Conjunctivae are normal  Lungs: Effort normal, breath sounds clear.  Cardiovascular: Regular rhythm, + tachycardia, no murmur  Abdomen: soft, non-tender to palpation, non-distended. No hepatomegaly palpated.    Labs:  Hemoglobin A1C   Date Value Ref Range Status   11/16/2022 8.4 (H) 4.0 - 5.6 % Final     Comment:     ADA Screening Guidelines:  5.7-6.4%  Consistent with prediabetes  >or=6.5%  Consistent with diabetes    High levels of fetal hemoglobin interfere with the HbA1C  assay. Heterozygous hemoglobin variants (HbS, HgC, etc)do  not significantly interfere with this assay.   However, presence of multiple variants may affect accuracy.     07/27/2022 8.6 (H) 4.0 - 5.6 % Final     Comment:     ADA Screening Guidelines:  5.7-6.4%  Consistent with prediabetes  >or=6.5%  Consistent with diabetes    High levels of fetal hemoglobin interfere with the HbA1C  assay. Heterozygous hemoglobin variants (HbS, HgC, etc)do  not significantly interfere with this assay.   However, presence of multiple variants may affect accuracy.     03/16/2022 9.0 (H) 4.0 - 5.6 % Final     Comment:     ADA Screening Guidelines:  5.7-6.4%  Consistent with prediabetes  >or=6.5%  Consistent with diabetes    High levels of fetal hemoglobin interfere with the HbA1C  assay. Heterozygous hemoglobin variants (HbS, HgC, etc)do  not significantly interfere with this assay.   However, presence of multiple variants may affect accuracy.         Last Screening tests:    Lab Results   Component Value Date    TSH 1.770 12/22/2022     Microalbumin  Lab Results   Component Value Date    LABMICR 22.0 11/21/2022    CREATRANDUR 157.7 12/22/2022    MICALBCREAT Unable to calculate 11/21/2022       Component      Latest Ref Rng & Units 4/9/2021   TTG IgA      <20 UNITS 6   IgA      40 - 350 mg/dL 178     Component      Latest Ref Rng & Units 7/27/2022    Cholesterol      120 - 199 mg/dL 192   Triglycerides      30 - 150 mg/dL 128   HDL      40 - 75 mg/dL 54   LDL Cholesterol External      63.0 - 159.0 mg/dL 112.4   HDL/Cholesterol Ratio      20.0 - 50.0 % 28.1   Total Cholesterol/HDL Ratio      2.0 - 5.0 3.6   Non-HDL Cholesterol      mg/dL 138     Eye Exam: Dr. Alonzo Mireles - [facility], last seen 3/31/2023    Gender Dysphoria  He is on testosterone 100 mg weekly. He gets the injections on Thursdays.     Assessment/Plan:  Nish is a 17 y.o. with T1D of 14 yrs 3 months duration on ~0.7 units/kg/day of insulin via CSII with Tandem TSlim. Nish has gender dysphoria, history of anxiety, depression, visual and auditory hallucinations. His last A1c was very elevated at 10.7% and had increased since the last visit, up from 9.7%.      His blood sugars and insulin settings were reviewed for the past two weeks. I reviewed and adjusted insulin dose: increased the basal settings from 7am - 12am, adjusted the ISF and carb ratio.  Overall hyperglycemia with little change with correction boluses by control program. There are few carb entries with boluses. He has lipo-hypertrophy and areas with scarring at infusion sites which is likely contributing to his variable insulin needs.    Insulin Instructions  Pump Settings   insulin aspart U-100 100 unit/mL injection (NovoLOG)   Last edited by Liliana Johnson NP on 6/14/2023 at 2:44 PM      Basal Rate   Total Basal Dose: 15.25 units/day   Time units/hr   12:00 AM 0.6    7:00 AM 0.65      Blood Glucose Target   Time mg/dL   12:00  - 120    9:00  - 150      Sensitivity Factor   Time mg/dL/unit   12:00 AM 45      Carb Ratio   Time g/unit   12:00 AM 8     Labs today: none    Follow up with Dr. Fernandez in 3 months.    It was a pleasure seeing your patient in our clinic today. Thank you for allowing us to participate in his care.         TAMIKO Huddleston, CPNP  Pediatric Endocrinology    Total time spent  on encounter (visit, lab/imaging review, documentation): 38 minutes

## 2023-06-14 NOTE — PATIENT INSTRUCTIONS
Insulin Instructions  Pump Settings   insulin aspart U-100 100 unit/mL injection (NovoLOG)   Last edited by Liliana Johnson NP on 6/14/2023 at 2:44 PM      Basal Rate   Total Basal Dose: 15.25 units/day   Time units/hr   12:00 AM 0.6    7:00 AM 0.65      Blood Glucose Target   Time mg/dL   12:00  - 120    9:00  - 150      Sensitivity Factor   Time mg/dL/unit   12:00 AM 45      Carb Ratio   Time g/unit   12:00 AM 8

## 2023-06-24 ENCOUNTER — TELEPHONE (OUTPATIENT)
Dept: PEDIATRIC GASTROENTEROLOGY | Facility: CLINIC | Age: 18
End: 2023-06-24
Payer: COMMERCIAL

## 2023-06-24 ENCOUNTER — NURSE TRIAGE (OUTPATIENT)
Dept: ADMINISTRATIVE | Facility: CLINIC | Age: 18
End: 2023-06-24
Payer: COMMERCIAL

## 2023-06-24 NOTE — TELEPHONE ENCOUNTER
I spoke with the triage nurse and called Nish, right after. Discussed his Bgs lately.  I advised to decrease basal rate to 0.5 u/hr and increase ISF to 50.  He will call back if any issues after he will make the above changes to his pump settings.    Nish verbalized understanding and agreed with the plan.

## 2023-06-24 NOTE — TELEPHONE ENCOUNTER
Patient transferred from patient access. States the Settings on pump changed. Insulin increased.    Blood sugar is going low, 102 right now. States has been low the last few days, 44 yesterday and day before. I drink soda and brought up. But then the pump gives me more insulin. So I turned off pump yesterday. States when he BS drops he gets so weak he cannot stand. He is home with his mother right now. Advised our care advice would be ED or PCP triage. Advised I would reach out to on call endocrine       Call to on call. Dr. Villa, report given with MRN. Dr. Villa will call patient.   Reason for Disposition   Child sounds very sick or weak to the triager    Additional Information   Negative: Unconscious or difficult to awaken   Negative: Seizure occurs   Negative: Acting confused (e.g., disoriented, slurred speech)   Negative: Very weak (e.g., can't stand)   Negative: Sounds like a life-threatening emergency to the triager   Negative: [1] Vomiting AND [2] signs of dehydration (e.g., very dry mouth, no tears, dark urine, etc.)NOTE: Urine output will be normal or high in children with diabetes.   Negative: Diabetes medication overdose (e.g., insulin error) and triager unable to answer question   Negative: [1] Low blood glucose < 70 mg/dL (3.9 mmol/L) persists > 30 minutes on recheck AND [2] using low blood sugar Care Advice   Negative: [1] Low blood sugar symptoms persist > 30 minutes AND [2] using low blood sugar Care Advice    Protocols used: Diabetes - Low Blood Sugar-P-

## 2023-06-26 NOTE — TELEPHONE ENCOUNTER
Called Mock to check in after he spoke to Nurse on Call this weekend. He reported he has not had as many lows, but he still feels like he is getting too much insulin with his meals. I recommended adjusting his carb ratio to 1:9. Encouraged him to call us if he continues to have highs or lows with these changes, and he expressed understanding.    Insulin Instructions  Pump Settings   insulin aspart U-100 100 unit/mL injection (NovoLOG)   Last edited by Naty Lafleur NP on 6/26/2023 at 9:15 AM      Basal Rate   Total Basal Dose: 12 units/day   Time units/hr   12:00 AM 0.5    7:00 AM 0.5      Blood Glucose Target   Time mg/dL   12:00  - 120    9:00  - 150      Sensitivity Factor   Time mg/dL/unit   12:00 AM 50    7:00 AM 45      Carb Ratio   Time g/unit   12:00 AM 9

## 2023-06-29 ENCOUNTER — TELEPHONE (OUTPATIENT)
Dept: PEDIATRIC ENDOCRINOLOGY | Facility: CLINIC | Age: 18
End: 2023-06-29
Payer: COMMERCIAL

## 2023-06-29 NOTE — TELEPHONE ENCOUNTER
----- Message from Jocelynn Bojorquez sent at 6/29/2023 12:15 PM CDT -----  Contact: [email or web address] [phone]  [email or web address]     Patient is calling for Medical Advice regarding:    How long has patient had these symptoms:    Pharmacy name and phone#:    Would like response via [employer]: call back    Comments:Toña is calling from dr Montero's of[facility] regarding a referral that needs to be faxed oer to them. Fax# [phone] this is his correct fax#

## 2023-06-29 NOTE — TELEPHONE ENCOUNTER
Spoke with Toña at Dr. Montero's office and informed her that referral has been sent to fax number provided. After review Toña stated that no other information is needed at this time.

## 2023-08-23 ENCOUNTER — TELEPHONE (OUTPATIENT)
Dept: PEDIATRIC ENDOCRINOLOGY | Facility: CLINIC | Age: 18
End: 2023-08-23
Payer: COMMERCIAL

## 2023-08-23 NOTE — TELEPHONE ENCOUNTER
Reviewed pump and glucose data per patient request due to hyperglycemia. Recommended adjustments to his pump settings. Changes made with patient over the phone.    Insulin Instructions  Pump Settings   insulin aspart U-100 100 unit/mL injection (NovoLOG)   Last edited by Liliana Johnson NP on 8/23/2023 at 4:55 PM      Basal Rate   Total Basal Dose: 14.4 units/day   Time units/hr   12:00 AM 0.6    7:00 AM 0.6      Blood Glucose Target   Time mg/dL   12:00  - 120    9:00  - 150      Sensitivity Factor   Time mg/dL/unit   12:00 AM 45    7:00 AM 40      Carb Ratio   Time g/unit   12:00 AM 9     Recommended follow up tomorrow if continued issues with hyperglycemia.      Liliana MORRISON CPNP  Pediatric Endocrinology

## 2023-09-18 ENCOUNTER — LAB VISIT (OUTPATIENT)
Dept: LAB | Facility: HOSPITAL | Age: 18
End: 2023-09-18
Attending: PEDIATRICS
Payer: COMMERCIAL

## 2023-09-18 DIAGNOSIS — Z78.9 FEMALE-TO-MALE TRANSGENDER PERSON: ICD-10-CM

## 2023-09-18 DIAGNOSIS — E10.65 UNCONTROLLED TYPE 1 DIABETES MELLITUS WITH HYPERGLYCEMIA: ICD-10-CM

## 2023-09-18 LAB
CHOLEST SERPL-MCNC: 180 MG/DL (ref 120–199)
CHOLEST/HDLC SERPL: 3.5 {RATIO} (ref 2–5)
ESTIMATED AVG GLUCOSE: 197 MG/DL (ref 68–131)
HBA1C MFR BLD: 8.5 % (ref 4–5.6)
HDLC SERPL-MCNC: 51 MG/DL (ref 40–75)
HDLC SERPL: 28.3 % (ref 20–50)
LDLC SERPL CALC-MCNC: 103 MG/DL (ref 63–159)
NONHDLC SERPL-MCNC: 129 MG/DL
TRIGL SERPL-MCNC: 130 MG/DL (ref 30–150)

## 2023-09-18 PROCEDURE — 85018 HEMOGLOBIN: CPT | Performed by: PEDIATRICS

## 2023-09-18 PROCEDURE — 84403 ASSAY OF TOTAL TESTOSTERONE: CPT | Performed by: PEDIATRICS

## 2023-09-18 PROCEDURE — 83036 HEMOGLOBIN GLYCOSYLATED A1C: CPT | Performed by: PEDIATRICS

## 2023-09-18 PROCEDURE — 80061 LIPID PANEL: CPT | Performed by: PEDIATRICS

## 2023-09-18 PROCEDURE — 36415 COLL VENOUS BLD VENIPUNCTURE: CPT | Performed by: PEDIATRICS

## 2023-10-03 ENCOUNTER — CLINICAL SUPPORT (OUTPATIENT)
Dept: DIABETES | Facility: CLINIC | Age: 18
End: 2023-10-03
Payer: COMMERCIAL

## 2023-10-03 DIAGNOSIS — E10.649 UNCONTROLLED TYPE 1 DIABETES MELLITUS WITH HYPOGLYCEMIA WITHOUT COMA: Primary | ICD-10-CM

## 2023-10-03 PROCEDURE — G0108 PR DIAB MANAGE TRN  PER INDIV: ICD-10-PCS | Mod: S$GLB,,, | Performed by: PEDIATRICS

## 2023-10-03 PROCEDURE — G0108 DIAB MANAGE TRN  PER INDIV: HCPCS | Mod: S$GLB,,, | Performed by: PEDIATRICS

## 2023-10-03 NOTE — PROGRESS NOTES
Diabetes Care Specialist Progress Note  Author: Mary Ugalde RD, CDE  Date: 10/5/2023    Program Intake  Reason for Diabetes Program Visit:: Intervention  Type of Intervention:: Individual  Individual: Education  Education: Self-Management Skill Review, Advanced Pump    Current diabetes risk level:: moderate    In the last 12 months, have you:: used emergency room services  Was the ER or hospital admission related to diabetes?: No        Lab Results   Component Value Date    HGBA1C 8.5 (H) 2023           Clinical  Problem Review  Reviewed Problem List with Patient: yes  Active comorbidities affecting diabetes self-care.: no    Clinical Assessment  Current Diabetes Treatment: Insulin, Insulin pump  Novolog via TSlim  Basal:   12am:  0.6 un/hr  ISF:   12am:  45   7am:  40  ICR:   12am:  8        Have you ever experienced hypoglycemia (low blood sugar)?: yes  In the last month, how often have you experienced low blood sugar?: once every other week    Have you ever experienced hyperglycemia (high blood sugar)?: yes  In the last month, how often have you experienced high blood sugar?: once a day      SMBG via Dexcom G6  Average B mg/dL  Time in Range:  48%  (1% low, 50% high)            Medication Information  Medication adherence impacting ability to self-manage diabetes?: No    Labs  Lab Compliance Barriers: No          Nutritional Status  Diet:  (+carb counting)  Current nutritional status an area of need that is impacting patient's ability to self-manage diabetes?: No                Diabetes Self-Management Care Plan:    Today's Diabetes Self-Management Care Plan was developed with Lois's input. Lois has agreed to work toward the following goal(s) to improve his/her overall diabetes control.      Care Plan: Diabetes Management   Updates made since 2023 12:00 AM        Problem: Medications         Long-Range Goal: Patient Agrees to use insulin pump as instructed    Start Date: 10/3/2023   Expected  End Date: 4/30/2024   Priority: High   Barriers: Knowledge deficit   Note:      iNsh is here today for general DSM review.     He is using the TSlim pump but not having great control.   In college at [employer] - mostly online.     Usually only eats 1 meal daily with snacks.   Does well with remembering to bolus for food, but timing is not great. Discussed importance of appropriate timing with meal bolus.   He is sometimes unsure about if he will finish his entire meal. Has tried to split bolus, but will often forget to give the remaining amount.   Instructed on how to give extended bolus today. Will try 75/25 over 30 min. Usually knows 5-10 min into a meal if he will finish or not.  If NOT going to finish, can cancel extended portion.  If going to finish, can leave pump alone.     Reviewed carb counting.   Discussed using little bursts of activity/exercise to help correction boluses metabolize instead of stacking boluses.           Changes today discussed with NP:    Basal:   12am:  0.65 un/hr  ISF:   12am:  45   7am:  35   4pm:  40       ICR:   12am:  8   7am:  7   4pm:  8       Task: Reviewed with patient all current diabetes medications and provided basic review of the purpose, dosage, frequency, side effects, and storage of both oral and injectable diabetes medications. Completed 10/5/2023        Task: Discussed guidelines for preventing, detecting and treating hypoglycemia and hyperglycemia and reviewed the importance of meal and medication timing with diabetes mediations for prevention of hypoglycemia and maximum drug benefit. Completed 10/5/2023                Follow Up Plan     F/u in 3 months            Today's care plan and follow up schedule was discussed with patient.  Lois verbalized understanding of the care plan, goals, and agrees to follow up plan.        The patient was encouraged to communicate with his/her health care provider/physician and care team regarding his/her condition(s) and treatment.  I  provided the patient with my contact information today and encouraged to contact me via phone or [facility] Patient Portal as needed.           Length of Visit   Total Time: 60 Minutes

## 2023-10-06 LAB
HGB BLD-MCNC: 14.1 G/DL (ref 12–16)
TESTOST SERPL-MCNC: 486 NG/DL (ref 5–73)

## 2023-10-11 ENCOUNTER — PATIENT MESSAGE (OUTPATIENT)
Dept: PEDIATRIC ENDOCRINOLOGY | Facility: CLINIC | Age: 18
End: 2023-10-11
Payer: COMMERCIAL

## 2023-10-11 DIAGNOSIS — E10.65 TYPE 1 DIABETES MELLITUS WITH HYPERGLYCEMIA: Primary | ICD-10-CM

## 2023-10-16 ENCOUNTER — LAB VISIT (OUTPATIENT)
Dept: LAB | Facility: HOSPITAL | Age: 18
End: 2023-10-16
Attending: PEDIATRICS
Payer: COMMERCIAL

## 2023-10-16 DIAGNOSIS — E10.65 TYPE 1 DIABETES MELLITUS WITH HYPERGLYCEMIA: ICD-10-CM

## 2023-10-16 LAB
ESTIMATED AVG GLUCOSE: 160 MG/DL (ref 68–131)
HBA1C MFR BLD: 7.2 % (ref 4–5.6)

## 2023-10-16 PROCEDURE — 36415 COLL VENOUS BLD VENIPUNCTURE: CPT | Mod: PO | Performed by: PEDIATRICS

## 2023-10-16 PROCEDURE — 83036 HEMOGLOBIN GLYCOSYLATED A1C: CPT | Performed by: PEDIATRICS

## 2023-12-05 ENCOUNTER — OFFICE VISIT (OUTPATIENT)
Dept: PEDIATRIC ENDOCRINOLOGY | Facility: CLINIC | Age: 18
End: 2023-12-05
Payer: COMMERCIAL

## 2023-12-05 VITALS
SYSTOLIC BLOOD PRESSURE: 137 MMHG | HEIGHT: 65 IN | BODY MASS INDEX: 22 KG/M2 | DIASTOLIC BLOOD PRESSURE: 75 MMHG | WEIGHT: 132.06 LBS | HEART RATE: 133 BPM

## 2023-12-05 DIAGNOSIS — Z46.81 INSULIN PUMP TITRATION: ICD-10-CM

## 2023-12-05 DIAGNOSIS — Z96.41 INSULIN PUMP STATUS: ICD-10-CM

## 2023-12-05 DIAGNOSIS — E10.65 TYPE 1 DIABETES MELLITUS WITH HYPERGLYCEMIA: Primary | ICD-10-CM

## 2023-12-05 DIAGNOSIS — E10.65 UNCONTROLLED TYPE 1 DIABETES MELLITUS WITH HYPERGLYCEMIA: ICD-10-CM

## 2023-12-05 LAB — HBA1C MFR BLD: 7.9 % (ref 4–6.4)

## 2023-12-05 PROCEDURE — 3075F SYST BP GE 130 - 139MM HG: CPT | Mod: CPTII,S$GLB,, | Performed by: NURSE PRACTITIONER

## 2023-12-05 PROCEDURE — 83036 POCT HEMOGLOBIN A1C: ICD-10-PCS | Mod: QW,S$GLB,, | Performed by: NURSE PRACTITIONER

## 2023-12-05 PROCEDURE — 3075F PR MOST RECENT SYSTOLIC BLOOD PRESS GE 130-139MM HG: ICD-10-PCS | Mod: CPTII,S$GLB,, | Performed by: NURSE PRACTITIONER

## 2023-12-05 PROCEDURE — 1160F RVW MEDS BY RX/DR IN RCRD: CPT | Mod: CPTII,S$GLB,, | Performed by: NURSE PRACTITIONER

## 2023-12-05 PROCEDURE — 1159F PR MEDICATION LIST DOCUMENTED IN MEDICAL RECORD: ICD-10-PCS | Mod: CPTII,S$GLB,, | Performed by: NURSE PRACTITIONER

## 2023-12-05 PROCEDURE — 1159F MED LIST DOCD IN RCRD: CPT | Mod: CPTII,S$GLB,, | Performed by: NURSE PRACTITIONER

## 2023-12-05 PROCEDURE — 83036 HEMOGLOBIN GLYCOSYLATED A1C: CPT | Mod: QW,S$GLB,, | Performed by: NURSE PRACTITIONER

## 2023-12-05 PROCEDURE — 99215 PR OFFICE/OUTPT VISIT, EST, LEVL V, 40-54 MIN: ICD-10-PCS | Mod: S$GLB,,, | Performed by: NURSE PRACTITIONER

## 2023-12-05 PROCEDURE — 1160F PR REVIEW ALL MEDS BY PRESCRIBER/CLIN PHARMACIST DOCUMENTED: ICD-10-PCS | Mod: CPTII,S$GLB,, | Performed by: NURSE PRACTITIONER

## 2023-12-05 PROCEDURE — 3078F DIAST BP <80 MM HG: CPT | Mod: CPTII,S$GLB,, | Performed by: NURSE PRACTITIONER

## 2023-12-05 PROCEDURE — 3078F PR MOST RECENT DIASTOLIC BLOOD PRESSURE < 80 MM HG: ICD-10-PCS | Mod: CPTII,S$GLB,, | Performed by: NURSE PRACTITIONER

## 2023-12-05 PROCEDURE — 99999 PR PBB SHADOW E&M-EST. PATIENT-LVL V: CPT | Mod: PBBFAC,,, | Performed by: NURSE PRACTITIONER

## 2023-12-05 PROCEDURE — 3051F PR MOST RECENT HEMOGLOBIN A1C LEVEL 7.0 - < 8.0%: ICD-10-PCS | Mod: CPTII,S$GLB,, | Performed by: NURSE PRACTITIONER

## 2023-12-05 PROCEDURE — 3008F PR BODY MASS INDEX (BMI) DOCUMENTED: ICD-10-PCS | Mod: CPTII,S$GLB,, | Performed by: NURSE PRACTITIONER

## 2023-12-05 PROCEDURE — 3008F BODY MASS INDEX DOCD: CPT | Mod: CPTII,S$GLB,, | Performed by: NURSE PRACTITIONER

## 2023-12-05 PROCEDURE — 95251 CONT GLUC MNTR ANALYSIS I&R: CPT | Mod: S$GLB,,, | Performed by: NURSE PRACTITIONER

## 2023-12-05 PROCEDURE — 99999 PR PBB SHADOW E&M-EST. PATIENT-LVL V: ICD-10-PCS | Mod: PBBFAC,,, | Performed by: NURSE PRACTITIONER

## 2023-12-05 PROCEDURE — 95251 PR GLUCOSE MONITOR, 72 HOUR, PHYS INTERP: ICD-10-PCS | Mod: S$GLB,,, | Performed by: NURSE PRACTITIONER

## 2023-12-05 PROCEDURE — 3051F HG A1C>EQUAL 7.0%<8.0%: CPT | Mod: CPTII,S$GLB,, | Performed by: NURSE PRACTITIONER

## 2023-12-05 PROCEDURE — 99215 OFFICE O/P EST HI 40 MIN: CPT | Mod: S$GLB,,, | Performed by: NURSE PRACTITIONER

## 2023-12-05 NOTE — PATIENT INSTRUCTIONS
Insulin Instructions  Pump Settings   insulin aspart U-100 100 unit/mL injection (NovoLOG)   Last edited by Liliana Johnson NP on 12/5/2023 at 4:02 PM      Basal Rate   Total Basal Dose: 15.6 units/day   Time units/hr   12:00 AM 0.65    7:00 AM 0.65      Blood Glucose Target   Time mg/dL   12:00  - 130    7:00  - 120    4:00  - 130      Sensitivity Factor   Time mg/dL/unit   12:00 AM 45    7:00 AM 35    4:00 PM 40      Carb Ratio   Time g/unit   12:00 AM 7    7:00 AM 6    4:00 PM 7

## 2023-12-05 NOTE — PROGRESS NOTES
Nish Mckeon is a 17 y.o. being seen in the pediatric endocrinology clinic today in follow up for type 1 diabetes mellitus.     Nish was diagnosed with type 1 diabetes in 3/2009. Nish identifies as a transgender male and started on hormone therapy with testosterone in August 2019. Nish prefers the use of male pronouns (he/him).  He sees mental health providers, Dr. Weeks and Jean Marie Sorto.    Nish was last seen in our pediatric endocrine clinic in September 2023 by Dr. Fernandez.    Interval History Diabetes:   He is on CSII using Tandem TSlim, started on 11/23/2021. Previously using the Medtronic 630 pump. He is wearing Dexcom G6 CGM.  No hospitalizations or new medical diagnoses since his last visit. He has been seen by rheumatology and neurology for myalgias. Labs were done at both visits and did not show any known cause of symptoms. He is on Seroquel and Mertipine.    Nish states he has been without the CGM for the past 2 weeks. He is unsure why he hasn't received the shipment. He reports his glucose levels good when Dexcom is working and connected. He has had site malfunctions and didn't realize it until the glucose levels were in the 400-500s. He has been checking his glucose with the meter 6-8 times/day and has had some lows, as low as 31 mg/dl on meter download.     Glucose Data:  Meter: True Metrix (new meter)  Average glucose: 179, range between  mg/dl  92 readings, 7.6% below target (<70)        Interpretation: TIR has decreased (51% at last visit). Control IQ use only active 38% of the time. Glycemic control much better with control IQ and no severe hypoglycemia. Significant hyperglycemia is usually at times when pump is off or not in auto mode. Occasional missed meal bolus followed by prolonged duration of hyperglycemia.     Hypoglycemia: yes, significant amount on days with CGM use (~8% of the time)     Infusion sites: abdominal wall and arm(s).     Insulin Instructions  Pump Settings   insulin aspart U-100  "100 unit/mL injection (NovoLOG)   Last edited by Abbie Fernandez MD on 9/18/2023 at 2:13 PM      Basal Rate   Total Basal Dose: 14.4 units/day   Time units/hr   12:00 AM 0.6    7:00 AM 0.6      Blood Glucose Target   Time mg/dL   12:00  - 120    9:00  - 150      Sensitivity Factor   Time mg/dL/unit   12:00 AM 45    7:00 AM 40      Carb Ratio   Time g/unit   12:00 AM 8     Nutrition/Exercise:  Nutrition: carb counting but is not on a specified limit, occasional missed carb entry    Review of growth chart shows: ~8 lb weight gain since last visit    Exercise: no scheduled physical activity    Review of Systems:  Unremarkable unless otherwise noted in HPI  +polyuria and polydipsia    Past Medical/Family/Surgical History:  I have reviewed, and verified the past medical, surgical, and family history and updated as appropriate. No changes reported.    Social History:  He is attending [employer].    Meds:  Reviewed and reconciled.     Physical Exam:  /75 (BP Location: Left arm)   Pulse (!) 133   Ht 5' 5.32" (1.659 m)   Wt 59.9 kg (132 lb 0.9 oz)   BMI 21.76 kg/m²    General: alert, pleasant demeanor, actively engaged in visit  HEENT: mucous membranes moist, no oral lesions  Injection Sites: normal to upper arms, small area of hard nodule on right side of abdomen   Eyes:  Conjunctivae are normal  Lungs: Effort normal, breath sounds clear.  Cardiovascular: Regular rhythm, + tachycardia, no murmur  Abdomen: soft, non-tender to palpation, non-distended.   Foot exam: Inspection: no signs of fungal infection, no ulceration, calluses, or blisters, nails clean and short. No deformities  Neuro: No loss of protective sensation, tested with 10-g monofilament at 4 sites (1st, 3rd, 5th metatarsal heads and plantar surface of distal hallux) + vibration   Vascular: posterior tibial and dorsalis pedis pulses present, feet warm    A1c Trend:  Hemoglobin A1C   Date Value Ref Range Status   10/16/2023 7.2 (H) 4.0 - 5.6 % " Final     Comment:     ADA Screening Guidelines:  5.7-6.4%  Consistent with prediabetes  >or=6.5%  Consistent with diabetes    High levels of fetal hemoglobin interfere with the HbA1C  assay. Heterozygous hemoglobin variants (HbS, HgC, etc)do  not significantly interfere with this assay.   However, presence of multiple variants may affect accuracy.     09/18/2023 8.5 (H) 4.0 - 5.6 % Final     Comment:     ADA Screening Guidelines:  5.7-6.4%  Consistent with prediabetes  >or=6.5%  Consistent with diabetes    High levels of fetal hemoglobin interfere with the HbA1C  assay. Heterozygous hemoglobin variants (HbS, HgC, etc)do  not significantly interfere with this assay.   However, presence of multiple variants may affect accuracy.     11/16/2022 8.4 (H) 4.0 - 5.6 % Final     Comment:     ADA Screening Guidelines:  5.7-6.4%  Consistent with prediabetes  >or=6.5%  Consistent with diabetes    High levels of fetal hemoglobin interfere with the HbA1C  assay. Heterozygous hemoglobin variants (HbS, HgC, etc)do  not significantly interfere with this assay.   However, presence of multiple variants may affect accuracy.       Component      Latest Ref Rng 2/15/2023 5/9/2023   Hemoglobin A1C, POC      4 - 6.4 % 9.7 !  10.7 !       Labs:    Lab Results   Component Value Date    TSH 1.770 12/22/2022     Lab Results   Component Value Date    LABMICR 22.0 11/21/2022    CREATRANDUR 157.7 12/22/2022    MICALBCREAT Unable to calculate 11/21/2022     Lab Results   Component Value Date    TTGIGA 6 04/09/2021     Lab Results   Component Value Date     04/09/2021       Lab Results   Component Value Date    CHOL 180 09/18/2023    HDL 51 09/18/2023    LDLCALC 103.0 09/18/2023    TRIG 130 09/18/2023    CHOLHDL 28.3 09/18/2023       Eye Exam: Dr. Alonzo Mireles - [facility], last seen 3/31/2023. Found to have a retinal tear, had laser treatment. No diabetic retinopathy.    Gender Dysphoria  He is on testosterone 100  mg weekly. He gets the injections on Tuesdays. Nish reports he is scheduled to have top surgery in the next couple of weeks.    Assessment/Plan:  Nish is a 17 y.o. with T1D of 14.5 yrs duration on ~0.7 units/kg/day of insulin via CSII with Tandem TSlim. Nish has gender dysphoria, history of anxiety, depression, visual and auditory hallucinations. A1C has increased since his last value, up from 7.2%.    Lab Results   Component Value Date    [id number] 7.9 (A) 12/05/2023     Nish's diabetes is under sub-optimal control. He has improved daily pump management and his previous A1C was just above target. The decline in glucose control is most likely due to lack of CGM with his pump only in automated mode ~30% of the time.      His blood sugars and insulin settings were reviewed for the past 30 days. I reviewed and adjusted insulin dose: adjusted the carb ratio and increased the basal settings.  We discussed importance of proactively contacting DME company so he doesn't run out of supplies. Showed him the [employer] tevin and encouraged him to download it. It may be that his insurance is no longer preferring DME to provide Dexcom and he may need to get it from the pharmacy. Our office will look into this for him.    Long acting insulin: Lantus    Glucagon: Baqsimi- active Rx    Screening tests:  Depression screen: administered PHQ-9 modified for adolescents, 2/15/2023. Total score: 4, 0 for question #9. Due 2/2024.  Lipid panel screening - recommended in 3 years if normal, LDL goal <100: Due November 2024, last LDL just above goal.  Thyroid screening annually - Due 12/2023, will obtain at next visit.  Celiac screen - baseline and 2 yrs after DM diagnosis: last checked 4/2021- normal  Eye Exam: Every 1-2 years after 5 years of DM duration (if under good control): Due March 2024  Comprehensive Foot Exam: Annually after 5 years of DM duration:  Done today.  Microablumin/creatinine ratio: Annually after 5 yrs of DM duration:  Overdue 11/2023, will plan to obtain at next visit.    Labs today: POC A1C    Follow up in 3 months. Discussed transition to adult endocrinology. Will plan to place referral at his next visit.    It was a pleasure to see your patient in clinic today. Please call with any questions or concerns.      Liliana MORRISON, CPNP  Pediatric Endocrinology    Total time spent on encounter (visit, lab/imaging review, documentation): 48 minutes

## 2023-12-06 ENCOUNTER — TELEPHONE (OUTPATIENT)
Dept: PEDIATRIC ENDOCRINOLOGY | Facility: CLINIC | Age: 18
End: 2023-12-06
Payer: COMMERCIAL

## 2023-12-06 RX ORDER — BLOOD-GLUCOSE SENSOR
EACH MISCELLANEOUS
Qty: 3 EACH | Refills: 4 | Status: SHIPPED | OUTPATIENT
Start: 2023-12-06

## 2023-12-06 RX ORDER — BLOOD-GLUCOSE TRANSMITTER
EACH MISCELLANEOUS
Qty: 1 EACH | Refills: 1 | Status: SHIPPED | OUTPATIENT
Start: 2023-12-06

## 2023-12-06 RX ORDER — BLOOD-GLUCOSE TRANSMITTER
EACH MISCELLANEOUS
Status: CANCELLED | OUTPATIENT
Start: 2023-12-06 | End: 2024-12-05

## 2023-12-06 RX ORDER — BLOOD-GLUCOSE SENSOR
EACH MISCELLANEOUS
Status: CANCELLED | OUTPATIENT
Start: 2023-12-06 | End: 2024-12-05

## 2023-12-06 RX ORDER — CALCIUM CITRATE/VITAMIN D3 200MG-6.25
TABLET ORAL
Qty: 200 EACH | Refills: 2 | Status: SHIPPED | OUTPATIENT
Start: 2023-12-06

## 2023-12-06 NOTE — TELEPHONE ENCOUNTER
"Provider request to check on Dexcom supplies at DME and to check which diabetes test supplies are on the patient's PDL.     Patient has BCBS of [place]. Preferred diabetic testing supplies is true metrix. Dexcom G6 is on the PDL.     Contacted [employer], patient's PBM and was informed that Dexcom is covered for pharmacy benefits. Below info is pharmacy benefit information.     RxBIN [id number]  PCN  [id number]  RxGroup: [id number]     Attempted PA on cover my meds, but PA states "Member should be able to get the drug/product without a PA at this time."    Orders pended and routed to the provider.         "

## 2023-12-07 NOTE — TELEPHONE ENCOUNTER
Called [facility] pharmacy. Successful claim for all Dexcom supplies and True Metrix test strips.     Contacted the patient and he reported he has picked up the prescription sent in yesterday. Advised patient to contact the office for any additional question or concerns. The patient verbalized understanding.

## 2024-01-06 DIAGNOSIS — E10.649 UNCONTROLLED TYPE 1 DIABETES MELLITUS WITH HYPOGLYCEMIA WITHOUT COMA: ICD-10-CM

## 2024-01-09 RX ORDER — INSULIN ASPART 100 [IU]/ML
INJECTION, SOLUTION INTRAVENOUS; SUBCUTANEOUS
Qty: 30 ML | Refills: 3 | Status: SHIPPED | OUTPATIENT
Start: 2024-01-09

## 2024-02-27 ENCOUNTER — TELEPHONE (OUTPATIENT)
Dept: PEDIATRIC ENDOCRINOLOGY | Facility: CLINIC | Age: 19
End: 2024-02-27
Payer: COMMERCIAL

## 2024-02-27 NOTE — TELEPHONE ENCOUNTER
Spoke with Nish and advsied that appt on 03/05/2024 will need to be rescheduled because Liliana is not available on that day. Rescheduled appt to 04/11/2024 at 10:00 am. Nish verbalized understanding.

## 2024-03-12 ENCOUNTER — PATIENT MESSAGE (OUTPATIENT)
Dept: PEDIATRIC ENDOCRINOLOGY | Facility: CLINIC | Age: 19
End: 2024-03-12
Payer: MEDICAID

## 2024-03-14 ENCOUNTER — TELEPHONE (OUTPATIENT)
Dept: PEDIATRIC ENDOCRINOLOGY | Facility: CLINIC | Age: 19
End: 2024-03-14
Payer: MEDICAID

## 2024-03-14 ENCOUNTER — PATIENT MESSAGE (OUTPATIENT)
Dept: PEDIATRIC ENDOCRINOLOGY | Facility: CLINIC | Age: 19
End: 2024-03-14
Payer: MEDICAID

## 2024-03-14 NOTE — TELEPHONE ENCOUNTER
Spoke with pt and scheduled sooner f/u with diabetes educator due to high blood sugars. Pt verbalized understanding of appt date and time.

## 2024-03-18 DIAGNOSIS — E10.649 UNCONTROLLED TYPE 1 DIABETES MELLITUS WITH HYPOGLYCEMIA WITHOUT COMA: Primary | ICD-10-CM

## 2024-03-19 ENCOUNTER — CLINICAL SUPPORT (OUTPATIENT)
Dept: DIABETES | Facility: CLINIC | Age: 19
End: 2024-03-19
Payer: MEDICAID

## 2024-03-19 ENCOUNTER — TELEPHONE (OUTPATIENT)
Dept: PEDIATRIC ENDOCRINOLOGY | Facility: CLINIC | Age: 19
End: 2024-03-19
Payer: MEDICAID

## 2024-03-19 DIAGNOSIS — E10.649 UNCONTROLLED TYPE 1 DIABETES MELLITUS WITH HYPOGLYCEMIA WITHOUT COMA: ICD-10-CM

## 2024-03-19 PROCEDURE — 99999PBSHW PR PBB SHADOW TECHNICAL ONLY FILED TO HB: Mod: PBBFAC,,,

## 2024-03-19 PROCEDURE — 99211 OFF/OP EST MAY X REQ PHY/QHP: CPT | Mod: PBBFAC | Performed by: DIETITIAN, REGISTERED

## 2024-03-19 PROCEDURE — G0108 DIAB MANAGE TRN  PER INDIV: HCPCS | Mod: PBBFAC | Performed by: DIETITIAN, REGISTERED

## 2024-03-19 PROCEDURE — 99999 PR PBB SHADOW E&M-EST. PATIENT-LVL I: CPT | Mod: PBBFAC,,,

## 2024-03-19 NOTE — TELEPHONE ENCOUNTER
Provider request to check on patient Dexcom G6 sensor and transmitter at [facility].     No issues with sensor. Transmitter needs PA.     PA Dexcom G6 transmitter approved through 09/15/2024.    Contacted [facility] back and reported approval. Both Dexcom G6 sensor and transmitter have paid claim with $0 co-pay.

## 2024-03-19 NOTE — PROGRESS NOTES
Diabetes Care Specialist Progress Note  Author: Mary Ugalde RD, CDE  Date: 3/19/2024    Program Intake  Reason for Diabetes Program Visit:: Intervention  Type of Intervention:: Individual  Individual: Education  Education: Self-Management Skill Review, Pattern Management    Current diabetes risk level:: moderate        Lab Results   Component Value Date    HGBA1C 7.2 (H) 10/16/2023           Nish is a transgender male on hormone therapy with testosterone. Nish uses male pronouns (he/him).  He was diagnosed with T1DM in 2009 at age 3.    He is on CSII using Tandem TSlimX2 w/ CIQ, started on 2021. Previously used the Medtronic 630 pump.           Clinical  Problem Review  Reviewed Problem List with Patient: yes  Active comorbidities affecting diabetes self-care.: no      Clinical Assessment  Current Diabetes Treatment: Insulin, Insulin pump  Novolog via TSlim  (CIQ has been OFF since 2024 as he has not had Dexcom G6)  Basal:   12am:  0.65 un/hr  ISF:              12am:  45              7am:  35   4pm:  40  ICR:              12am:  7   7am:  6   4pm:  7  Target   12am:  130   7am:  120   4pm:  130      Have you ever experienced hypoglycemia (low blood sugar)?: yes  In the last month, how often have you experienced low blood sugar?:  (none recently)  Have you ever experienced hyperglycemia (high blood sugar)?: yes  In the last month, how often have you experienced high blood sugar?: more than once a day      SMBG 2-3 times daily  FB, 280, 579, 129, 174, 179, 211  Pre-prandial:  524, 216, 258, 264, 257, 484, 241  Post-prandial:  580, 97, 482, 89, 241, 211        Medication Information  Medication adherence impacting ability to self-manage diabetes?: No    Labs  Lab Compliance Barriers: No        Nutritional Status  Diet:  (+carb counting)  Current nutritional status an area of need that is impacting patient's ability to self-manage diabetes?: No               Diabetes Self-Management Care  Plan:    Today's Diabetes Self-Management Care Plan was developed with Lois's input. Lois has agreed to work toward the following goal(s) to improve his/her overall diabetes control.      Care Plan: Diabetes Management   Updates made since 2/21/2024 12:00 AM        Problem: Medications         Long-Range Goal: Patient Agrees to use insulin pump as instructed    Start Date: 10/3/2023   Expected End Date: 6/30/2024   This Visit's Progress: On track   Priority: High   Barriers: Knowledge deficit   Note:        Nish is here for help with recently elevated BGs.     He had a tooth extracted in February and has had limited healing until yesterday (3/18) - now noticing some healing.  He reports his dentist is not willing to do any more dental work until his sugars are better controlled.      He has not had a Dexcom on since January 2024 (reports unable to get sensors) and hence not using CIQ.   His fingersticks average in the 200-500 range.     CHRISTI Bass called pharmacy today, updated PA, and says dexcom supplies are ready for pickup.  Relayed info to Mock. Discussed he needs to try to call pharmacy instead of relying on [facility] tevin.     Gave him a sample Dexcom G6 in clinic today so he could resume sensor and CIQ asap.     Pump settings and logs reviewed with ADRIANNA Johnson today.     Minimal changes except made his ICR 1:6 all day.     We re-connected his ebindle tevin to clinic and will f/u with log review this and next week.     Mock and I both agree that his control will be much better with dexcom and CIQ.         With dental work, has been eating a soft/pureed diet.   Lots of higher carb smoothies, mashed potatoes, etc.   He reports he was able to eat a sandwich today.                      Follow Up Plan     F/u next week for log review          Today's care plan and follow up schedule was discussed with patient.  Lois verbalized understanding of the care plan, goals, and agrees to follow up plan.        The patient was encouraged  to communicate with his/her health care provider/physician and care team regarding his/her condition(s) and treatment.  I provided the patient with my contact information today and encouraged to contact me via phone or [facility] Patient Portal as needed.             Length of Visit   Total Time: 60 Minutes

## 2024-03-20 ENCOUNTER — PATIENT MESSAGE (OUTPATIENT)
Dept: DIABETES | Facility: CLINIC | Age: 19
End: 2024-03-20
Payer: MEDICAID

## 2024-03-22 ENCOUNTER — PATIENT MESSAGE (OUTPATIENT)
Dept: ENDOCRINOLOGY | Facility: CLINIC | Age: 19
End: 2024-03-22
Payer: MEDICAID

## 2024-04-11 ENCOUNTER — OFFICE VISIT (OUTPATIENT)
Dept: PEDIATRIC ENDOCRINOLOGY | Facility: CLINIC | Age: 19
End: 2024-04-11
Payer: MEDICAID

## 2024-04-11 ENCOUNTER — LAB VISIT (OUTPATIENT)
Dept: LAB | Facility: HOSPITAL | Age: 19
End: 2024-04-11
Payer: MEDICAID

## 2024-04-11 VITALS
HEART RATE: 103 BPM | HEIGHT: 65 IN | BODY MASS INDEX: 21.76 KG/M2 | WEIGHT: 130.63 LBS | DIASTOLIC BLOOD PRESSURE: 68 MMHG | SYSTOLIC BLOOD PRESSURE: 128 MMHG

## 2024-04-11 DIAGNOSIS — E10.65 TYPE 1 DIABETES MELLITUS WITH HYPERGLYCEMIA: ICD-10-CM

## 2024-04-11 DIAGNOSIS — E10.65 TYPE 1 DIABETES MELLITUS WITH HYPERGLYCEMIA: Primary | ICD-10-CM

## 2024-04-11 DIAGNOSIS — Z46.81 INSULIN PUMP TITRATION: ICD-10-CM

## 2024-04-11 DIAGNOSIS — Z97.8 USES SELF-APPLIED CONTINUOUS GLUCOSE MONITORING DEVICE: ICD-10-CM

## 2024-04-11 DIAGNOSIS — Z96.41 INSULIN PUMP STATUS: ICD-10-CM

## 2024-04-11 LAB
ESTIMATED AVG GLUCOSE: 186 MG/DL (ref 68–131)
HBA1C MFR BLD: 8.1 % (ref 4–5.6)
TSH SERPL DL<=0.005 MIU/L-ACNC: 1.2 UIU/ML (ref 0.4–4)

## 2024-04-11 PROCEDURE — 99215 OFFICE O/P EST HI 40 MIN: CPT | Mod: PBBFAC | Performed by: NURSE PRACTITIONER

## 2024-04-11 PROCEDURE — 36415 COLL VENOUS BLD VENIPUNCTURE: CPT | Performed by: NURSE PRACTITIONER

## 2024-04-11 PROCEDURE — 84443 ASSAY THYROID STIM HORMONE: CPT | Performed by: NURSE PRACTITIONER

## 2024-04-11 PROCEDURE — 3074F SYST BP LT 130 MM HG: CPT | Mod: CPTII,,, | Performed by: NURSE PRACTITIONER

## 2024-04-11 PROCEDURE — 1160F RVW MEDS BY RX/DR IN RCRD: CPT | Mod: CPTII,,, | Performed by: NURSE PRACTITIONER

## 2024-04-11 PROCEDURE — 95251 CONT GLUC MNTR ANALYSIS I&R: CPT | Mod: ,,, | Performed by: NURSE PRACTITIONER

## 2024-04-11 PROCEDURE — 99215 OFFICE O/P EST HI 40 MIN: CPT | Mod: S$PBB,,, | Performed by: NURSE PRACTITIONER

## 2024-04-11 PROCEDURE — 3008F BODY MASS INDEX DOCD: CPT | Mod: CPTII,,, | Performed by: NURSE PRACTITIONER

## 2024-04-11 PROCEDURE — 3078F DIAST BP <80 MM HG: CPT | Mod: CPTII,,, | Performed by: NURSE PRACTITIONER

## 2024-04-11 PROCEDURE — 1159F MED LIST DOCD IN RCRD: CPT | Mod: CPTII,,, | Performed by: NURSE PRACTITIONER

## 2024-04-11 PROCEDURE — 99999 PR PBB SHADOW E&M-EST. PATIENT-LVL V: CPT | Mod: PBBFAC,,, | Performed by: NURSE PRACTITIONER

## 2024-04-11 PROCEDURE — 3052F HG A1C>EQUAL 8.0%<EQUAL 9.0%: CPT | Mod: CPTII,,, | Performed by: NURSE PRACTITIONER

## 2024-04-11 PROCEDURE — 83036 HEMOGLOBIN GLYCOSYLATED A1C: CPT | Performed by: NURSE PRACTITIONER

## 2024-04-11 RX ORDER — BLOOD-GLUCOSE SENSOR
EACH MISCELLANEOUS
Qty: 3 EACH | Refills: 4 | Status: SHIPPED | OUTPATIENT
Start: 2024-04-11

## 2024-04-11 RX ORDER — CALCIUM CITRATE/VITAMIN D3 200MG-6.25
TABLET ORAL
Qty: 200 EACH | Refills: 2 | Status: SHIPPED | OUTPATIENT
Start: 2024-04-11

## 2024-04-11 RX ORDER — ERGOCALCIFEROL 1.25 MG/1
1 CAPSULE ORAL
COMMUNITY
Start: 2024-02-14

## 2024-04-11 RX ORDER — BLOOD-GLUCOSE TRANSMITTER
EACH MISCELLANEOUS
Qty: 1 EACH | Refills: 1 | Status: SHIPPED | OUTPATIENT
Start: 2024-04-11

## 2024-04-11 NOTE — PATIENT INSTRUCTIONS
Insulin Instructions  Pump Settings   insulin aspart U-100 100 unit/mL injection (NovoLOG)   Last edited by Liliana Johnson NP on 4/11/2024 at 10:35 AM      Basal Rate   Total Basal Dose: 18 units/day   Time units/hr   12:00 AM 0.7   12:00 PM 0.8      Blood Glucose Target   Time mg/dL   12:00  - 130    7:00  - 120    4:00  - 130      Sensitivity Factor   Time mg/dL/unit   12:00 AM 45    7:00 AM 32    4:00 PM 32      Carb Ratio   Time g/unit   12:00 AM 6    7:00 AM 6    4:00 PM 6

## 2024-04-11 NOTE — PROGRESS NOTES
Nish Mckeon is a 17 y.o. being seen in the pediatric endocrinology clinic today in follow up for type 1 diabetes mellitus.     Nish was diagnosed with type 1 diabetes in 3/2009. Nish identifies as a transgender male and started on hormone therapy with testosterone in August 2019. Nish prefers the use of male pronouns (he/him).  He sees mental health providers, Dr. Weeks and Jean Marie Sorto.    Nish was last seen in our pediatric endocrine clinic in December 2023. Seen by SOULEYMANE Krueger in March 2024.    Interval History Diabetes:   He is on CSII using Tandem TSlim, started on 11/23/2021. Previously used Medtronic 630 pump. He is wearing Dexcom G6 CGM.  No hospitalizations or new medical diagnoses since his last visit. He has been seen by rheumatology and neurology for myalgias. He is on Seroquel and Mertazapine. He has been seen by a new primary care provider and vitamin D level was checked and low. He states he has been replacing with high dose and completing course this week.    Nish reports he was having supply issues in February for Dexcom and glucose levels were running HIGH and in the 400s when he checked with the meter. He had dental procedures scheduled but due to healing concerns he did not complete until glucose better controlled. No site malfunctions but infusion set came out one night when sleeping and he woke up high.    He has made some adjustments to his pump settings over the past 2 weeks due to repeated low glucose readings mostly during the day while at school. He states he is treating the lows, glucose goes up but then goes low again. Changes made to his CF with guidance from SOULEYMANE. He states this has improved since changes made.    Nish still with complaints of increased heart rate. Seen by cardiology in the past. New PCP would like him to see a new cardiologist for evaluation.    Glucose Data:        TIR last visit: 46%    Interpretation:  More consistent use of Control IQ, active 75% of the time.  "Improvement in TIR since last visit. There are intermittent periods of hyperglycemia with multiple auto-correction delivery followed by hypoglycemia. He denies missing carb boluses but appear to be missed food coverage due to timing of excursions.      Hypoglycemia: yes, <2% on CGM data  Hypoglycemia awareness? Yes    Infusion sites: abdominal wall and arm(s).     Insulin Instructions  Pump Settings   insulin aspart U-100 100 unit/mL injection (NovoLOG)   Last edited by Liliana Johnson NP on 4/11/2024 at 10:04 AM      Basal Rate   Total Basal Dose: 18 units/day   Time units/hr   12:00 AM 0.7   12:00 PM 0.8      Blood Glucose Target   Time mg/dL   12:00  - 130    7:00  - 120    4:00  - 130      Sensitivity Factor   Time mg/dL/unit   12:00 AM 45    7:00 AM 35    4:00 PM 30      Carb Ratio   Time g/unit   12:00 AM 6    7:00 AM 6    4:00 PM 6     Nutrition/Exercise:  Nutrition: carb counting but is not on a specified limit, occasional missed carb entry    Review of growth chart shows: ~8 lb weight gain since last visit    Exercise: works out on Thursdays    Review of Systems:  Unremarkable unless otherwise noted in HPI    Past Medical/Family/Surgical History:  I have reviewed, and verified the past medical, surgical, and family history and updated as appropriate. No changes reported.    Social History:  He is attending [employer].  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    Meds:  Reviewed and reconciled.     Physical Exam:  /68   Pulse 103   Ht 5' 5.47" (1.663 m)   Wt 59.2 kg (130 lb 10 oz)   BMI 21.42 kg/m²    General: alert, actively engaged in visit  HEENT: mucous membranes moist, no oral lesions  Injection Sites: normal   Eyes:  Conjunctivae are normal  Lungs: Effort normal and breath sounds clear.  Cardiovascular: Regular rhythm, + tachycardia  Abdomen: soft, non-tender to palpation, non-distended.     A1c Trend:  Hemoglobin A1C   Date Value Ref Range Status   10/16/2023 7.2 (H) 4.0 - 5.6 " % Final     Comment:     ADA Screening Guidelines:  5.7-6.4%  Consistent with prediabetes  >or=6.5%  Consistent with diabetes    High levels of fetal hemoglobin interfere with the HbA1C  assay. Heterozygous hemoglobin variants (HbS, HgC, etc)do  not significantly interfere with this assay.   However, presence of multiple variants may affect accuracy.     09/18/2023 8.5 (H) 4.0 - 5.6 % Final     Comment:     ADA Screening Guidelines:  5.7-6.4%  Consistent with prediabetes  >or=6.5%  Consistent with diabetes    High levels of fetal hemoglobin interfere with the HbA1C  assay. Heterozygous hemoglobin variants (HbS, HgC, etc)do  not significantly interfere with this assay.   However, presence of multiple variants may affect accuracy.     11/16/2022 8.4 (H) 4.0 - 5.6 % Final     Comment:     ADA Screening Guidelines:  5.7-6.4%  Consistent with prediabetes  >or=6.5%  Consistent with diabetes    High levels of fetal hemoglobin interfere with the HbA1C  assay. Heterozygous hemoglobin variants (HbS, HgC, etc)do  not significantly interfere with this assay.   However, presence of multiple variants may affect accuracy.       Component      Latest Ref Rng 2/15/2023 5/9/2023   Hemoglobin A1C, POC      4 - 6.4 % 9.7 !  10.7 !       Labs:  Lab Results   Component Value Date    TSH 1.770 12/22/2022     Microalbumin  Lab Results   Component Value Date    LABMICR 22.0 11/21/2022    CREATRANDUR 157.7 12/22/2022    MICALBCREAT Unable to calculate 11/21/2022      Lab Results   Component Value Date    TTGIGA 6 04/09/2021     Lab Results   Component Value Date     04/09/2021       Lab Results   Component Value Date    CHOL 180 09/18/2023    HDL 51 09/18/2023    LDLCALC 103.0 09/18/2023    TRIG 130 09/18/2023    CHOLHDL 28.3 09/18/2023       Eye Exam: Dr. Alonzo Mireles - [facility], last seen March 2024. H/o retinal tear, had laser treatment. No diabetic retinopathy.    Gender Dysphoria  He is on testosterone  100 mg weekly. He gets the injections on Tuesdays.     Assessment/Plan:  Nish is a 17 y.o. with T1D of ~15 yrs duration on ~0.8 units/kg/day of insulin via CSII with Tandem TSlim. Nish has gender dysphoria, history of anxiety, depression, visual and auditory hallucinations. A1C has increased since his last value, up from 7.9%.    Lab Results   Component Value Date    HGBA1C 8.1 (H) 04/11/2024     Nish's diabetes is under sub-optimal control. Despite the increased time in range and pump use, the A1C has increased. The time spent in target glucose range has increased significantly since more time with CGM on and pump Control IQ.      His blood sugars and insulin settings were reviewed for the past 30 days. I reviewed and adjusted insulin dose: adjusted the correction factor slightly. He recently had some changes made to his settings 5 days ago and things are improved since that time. Reviewed pump download and data with Nish. Discussed issues with supplies and reviewed that Dexcom is a pharmacy covered RX. We will move forward with transition to adult endocrinology. Previously sent a [facility] to [facility]. Lynch [facility] but he would like to be seen in pump clinic at [facility].     Long acting insulin: yes, Lantus  Glucagon: Baqsimi- active Rx    Screening tests:  Depression screen: administered PHQ-9 modified for adolescents, 2/15/2023. Total score: 4, 0 for question #9. Followed by psych regularly.  Lipid panel screening - recommended in 3 years if normal, LDL goal <100: Due November 2024, last LDL just above goal.  Thyroid screening annually - Done today.  Celiac screen - baseline and 2 yrs after DM diagnosis: last checked 4/2021- normal  Eye Exam: Every 1-2 years after 5 years of DM duration (if under good control): Due March 2025  Comprehensive Foot Exam: Annually after 5 years of DM duration:  Due 12/2024  Microablumin/creatinine ratio: Annually after 5 yrs of DM duration: Overdue 11/2023, ordered today.    Labs  today: A1C, TSH, urine for MA/Cr    Lab Results   Component Value Date    TSH 1.195 04/11/2024     Follow up in 3 months with adult endocrinology in pump clinic.    It was a pleasure to see your patient in clinic today. Please call with any questions or concerns.      Liliana MORRISON, CPNP  Pediatric Endocrinology    Total time spent on encounter (visit, lab/imaging review, documentation): 43 minutes

## 2024-04-12 DIAGNOSIS — Z78.9 FEMALE-TO-MALE TRANSGENDER PERSON: ICD-10-CM

## 2024-04-12 RX ORDER — TESTOSTERONE CYPIONATE 200 MG/ML
INJECTION, SOLUTION INTRAMUSCULAR
Qty: 4 ML | Refills: 5 | Status: SHIPPED | OUTPATIENT
Start: 2024-04-12

## 2024-04-26 ENCOUNTER — TELEPHONE (OUTPATIENT)
Dept: ENDOCRINOLOGY | Facility: CLINIC | Age: 19
End: 2024-04-26
Payer: MEDICAID

## 2024-04-26 ENCOUNTER — PATIENT MESSAGE (OUTPATIENT)
Dept: DIABETES | Facility: CLINIC | Age: 19
End: 2024-04-26
Payer: MEDICAID

## 2024-04-26 NOTE — TELEPHONE ENCOUNTER
----- Message from Mary Ugalde RD, CDE sent at 4/26/2024  4:52 PM CDT -----  Hey can you do me a favor?    You send him a message a while back about getting set up with Dr. Green in pump clinic.     Can you just copy and paste and re-send the message (in a new message) so that he has the ability to respond to you and get scheduled?    When he tries to click respond now, it says the message is too old.         Thank you!!!

## 2024-05-11 DIAGNOSIS — E10.649 UNCONTROLLED TYPE 1 DIABETES MELLITUS WITH HYPOGLYCEMIA WITHOUT COMA: ICD-10-CM

## 2024-05-14 RX ORDER — URINE ACETONE TEST STRIPS
STRIP MISCELLANEOUS
Qty: 100 STRIP | Refills: 4 | Status: SHIPPED | OUTPATIENT
Start: 2024-05-14

## 2024-05-17 ENCOUNTER — PATIENT MESSAGE (OUTPATIENT)
Dept: DIABETES | Facility: CLINIC | Age: 19
End: 2024-05-17
Payer: MEDICAID

## 2024-05-28 ENCOUNTER — PATIENT MESSAGE (OUTPATIENT)
Dept: DIABETES | Facility: CLINIC | Age: 19
End: 2024-05-28
Payer: MEDICAID

## 2024-06-19 ENCOUNTER — PATIENT MESSAGE (OUTPATIENT)
Dept: DIABETES | Facility: CLINIC | Age: 19
End: 2024-06-19
Payer: MEDICAID

## 2024-07-05 ENCOUNTER — OFFICE VISIT (OUTPATIENT)
Dept: ENDOCRINOLOGY | Facility: CLINIC | Age: 19
End: 2024-07-05
Payer: MEDICAID

## 2024-07-05 ENCOUNTER — LAB VISIT (OUTPATIENT)
Dept: LAB | Facility: HOSPITAL | Age: 19
End: 2024-07-05
Payer: MEDICAID

## 2024-07-05 VITALS
SYSTOLIC BLOOD PRESSURE: 122 MMHG | WEIGHT: 130.5 LBS | HEIGHT: 65 IN | OXYGEN SATURATION: 98 % | DIASTOLIC BLOOD PRESSURE: 86 MMHG | HEART RATE: 129 BPM | BODY MASS INDEX: 21.74 KG/M2

## 2024-07-05 DIAGNOSIS — E10.9 TYPE 1 DIABETES MELLITUS WITHOUT COMPLICATION: ICD-10-CM

## 2024-07-05 DIAGNOSIS — Z96.41 INSULIN PUMP STATUS: ICD-10-CM

## 2024-07-05 DIAGNOSIS — F64.2 GENDER DYSPHORIA IN PEDIATRIC PATIENT: ICD-10-CM

## 2024-07-05 DIAGNOSIS — E10.649 UNCONTROLLED TYPE 1 DIABETES MELLITUS WITH HYPOGLYCEMIA, UNSPECIFIED HYPOGLYCEMIA COMA STATUS: ICD-10-CM

## 2024-07-05 DIAGNOSIS — E10.649 UNCONTROLLED TYPE 1 DIABETES MELLITUS WITH HYPOGLYCEMIA, UNSPECIFIED HYPOGLYCEMIA COMA STATUS: Primary | ICD-10-CM

## 2024-07-05 PROBLEM — M79.18 MYALGIA, MULTIPLE SITES: Status: RESOLVED | Noted: 2023-04-21 | Resolved: 2024-07-05

## 2024-07-05 PROBLEM — R11.10 REGURGITATION OF FOOD: Status: RESOLVED | Noted: 2021-07-19 | Resolved: 2024-07-05

## 2024-07-05 PROBLEM — R11.0 NAUSEA: Status: RESOLVED | Noted: 2021-07-19 | Resolved: 2024-07-05

## 2024-07-05 PROBLEM — R19.5 ABNORMAL STOOLS: Status: RESOLVED | Noted: 2021-07-19 | Resolved: 2024-07-05

## 2024-07-05 PROBLEM — R10.84 GENERALIZED ABDOMINAL PAIN: Status: RESOLVED | Noted: 2021-07-19 | Resolved: 2024-07-05

## 2024-07-05 LAB
25(OH)D3+25(OH)D2 SERPL-MCNC: 14 NG/ML (ref 30–96)
ESTIMATED AVG GLUCOSE: 180 MG/DL (ref 68–131)
HBA1C MFR BLD: 7.9 % (ref 4–5.6)

## 2024-07-05 PROCEDURE — 3074F SYST BP LT 130 MM HG: CPT | Mod: CPTII,,, | Performed by: INTERNAL MEDICINE

## 2024-07-05 PROCEDURE — 99214 OFFICE O/P EST MOD 30 MIN: CPT | Mod: PBBFAC

## 2024-07-05 PROCEDURE — 82306 VITAMIN D 25 HYDROXY: CPT

## 2024-07-05 PROCEDURE — 99999 PR PBB SHADOW E&M-EST. PATIENT-LVL IV: CPT | Mod: PBBFAC,,,

## 2024-07-05 PROCEDURE — 3008F BODY MASS INDEX DOCD: CPT | Mod: CPTII,,, | Performed by: INTERNAL MEDICINE

## 2024-07-05 PROCEDURE — 1160F RVW MEDS BY RX/DR IN RCRD: CPT | Mod: CPTII,,, | Performed by: INTERNAL MEDICINE

## 2024-07-05 PROCEDURE — 3079F DIAST BP 80-89 MM HG: CPT | Mod: CPTII,,, | Performed by: INTERNAL MEDICINE

## 2024-07-05 PROCEDURE — 36415 COLL VENOUS BLD VENIPUNCTURE: CPT

## 2024-07-05 PROCEDURE — 99214 OFFICE O/P EST MOD 30 MIN: CPT | Mod: S$PBB,,, | Performed by: INTERNAL MEDICINE

## 2024-07-05 PROCEDURE — 3051F HG A1C>EQUAL 7.0%<8.0%: CPT | Mod: CPTII,,, | Performed by: INTERNAL MEDICINE

## 2024-07-05 PROCEDURE — 83036 HEMOGLOBIN GLYCOSYLATED A1C: CPT

## 2024-07-05 PROCEDURE — 1159F MED LIST DOCD IN RCRD: CPT | Mod: CPTII,,, | Performed by: INTERNAL MEDICINE

## 2024-07-05 RX ORDER — BLOOD-GLUCOSE SENSOR
1 EACH MISCELLANEOUS
Qty: 3 EACH | Refills: 11 | Status: SHIPPED | OUTPATIENT
Start: 2024-07-05 | End: 2025-07-05

## 2024-07-05 NOTE — ASSESSMENT & PLAN NOTE
- Pump and sensor download reviewed today, significant for hyperglycemia likely due to missed meal-time boluses.  - Continue with current pump settings.  - Counseled patient regarding consistent meal time bolusing, encouraged to continue lifestyle modifications.  - Will change CGM to Dexcom 7, prescription sent. Advised patient to update pump software.  - Will repeat A1C today.

## 2024-07-05 NOTE — ASSESSMENT & PLAN NOTE
-On Tandem insulin pump, continue with current pump settings  -Will send Dexcom 7 prescription today, advised patient to update pump software before using it.  -Will repeat A1C today.

## 2024-07-05 NOTE — PROGRESS NOTES
Lois Mckeon is a 18 y.o. male presenting for evaluation of T1DM.    Previously seen by pediatric clinic, transitioning care now    History of Present Illness  T1DM  Diagnosed at age  3 yrs. Has brother with type 1 as well  Previously on lantus and Novolog before starting insulin pump 6-7 yrs ago.  Known complications: none    Interval History  For last 2 weeks he has been off transmitter as he lost it    Current Diabetes Regimen:  Using pump since 2017/2018      Glucagon: yes  Back-up basal: no    Diet/Exercise  No breakfast or lunch usually  First full meal of the day around 3-5 pm- Ham sandwiches, spaghetti, beans, chicken  Snack with Fruit cup around bed time  Hydrates with Gatorade    Last Hgb A1C:  Lab Results   Component Value Date    HGBA1C 8.1 (H) 04/11/2024         Glucose Monitoring:  Meter/CGM: Dexcom, not using for past 2 weeks    Hypoglycemic Episodes: yes, 3-4 times a week, last yesterday    DKA: denies    Screening / DM Complications:    Nephropathy:  ACEi/ARB: Not taking  Lab Results   Component Value Date    MICALBCREAT Unable to calculate 11/21/2022       Last Lipid Panel:  Statin: Not taking  Lab Results   Component Value Date    LDLCALC 103.0 09/18/2023       Last foot exam : 12/05/2023  Last eye exam : 07/10/2023;  no laser surgery or DR    Lab Results   Component Value Date    TSH 1.195 04/11/2024       Lab Results   Component Value Date    TTGIGA 6 04/09/2021       Aspirin: no        Current Outpatient Medications:     ACETAMINOPHEN (TYLENOL ORAL), Take by mouth as needed., Disp: , Rfl:     acetone, urine, test (KETOSTIX) Strp, PLEASE USE AS DIRECTED TO TEST FOR URINE KETONES WITH BLOOD SUGAR >250 MG/DL OR WHEN PATIENT IS ILL, Disp: 100 strip, Rfl: 4    blood-glucose meter Misc, Use to check blood glucose up to 8 times per day as directed by provider., Disp: 1 each, Rfl: 0    EASY TOUCH ALCOHOL PREP PADS PadM, USE BEFORE AND AFTER TESTING 10 TIMES DAILY, Disp: 300 each, Rfl: 0     "ergocalciferol (ERGOCALCIFEROL) 50,000 unit Cap, Take 1 capsule by mouth every 7 days., Disp: , Rfl:     glucagon (BAQSIMI) 3 mg/actuation Spry, 1 spray by Nasal route as needed (hypoglycemia)., Disp: 2 each, Rfl: 2    hydrOXYzine pamoate (VISTARIL) 50 MG Cap, Take 50 mg by mouth nightly as needed., Disp: , Rfl:     insulin aspart U-100 (NOVOLOG U-100 INSULIN ASPART) 100 unit/mL injection, ADMINISTER 200 UNITS VIA INSULIN PUMP EVERY 3 DAYS AS DIRECTED, Disp: 30 mL, Rfl: 3    ketone blood test (PRECISION XTRA B-KETONE) Strp, USE AS DIRECTED FOR BLOOD KETONE TESTING, Disp: 30 strip, Rfl: 4    lancets (ONETOUCH DELICA LANCETS) 33 gauge Misc, 1 lancet by Misc.(Non-Drug; Combo Route) route as needed (patient to test 10 times daily). Patient testing 10 times daily, Disp: 300 each, Rfl: 3    mirtazapine (REMERON) 30 MG tablet, Take 30 mg by mouth every evening., Disp: , Rfl:     needle, disp, 21 G 21 gauge x 1" Ndle, Use as directed to draw up testosterone, Disp: 30 each, Rfl: 2    nystatin (MYCOSTATIN) 100,000 unit/mL suspension, Take by mouth., Disp: , Rfl:     QUEtiapine (SEROQUEL) 300 MG Tab, Take 300 mg by mouth every evening., Disp: , Rfl:     QUEtiapine (SEROQUEL) 400 MG tablet, 800 mg., Disp: , Rfl:     syringe, disposable, 1 mL Syrg, Use as directed to inject testosterone, Disp: 15 each, Rfl: 4    testosterone cypionate (DEPOTESTOTERONE CYPIONATE) 200 mg/mL injection, INJECT 0.5 ML (100 MG TOTAL) INTO THE MUSCLE ONCE A WEEK., Disp: 4 mL, Rfl: 5    TRUE METRIX GLUCOSE TEST STRIP Strp, Use as directed to test blood glucose level up to 8 times a day, Disp: 200 each, Rfl: 2    urine glucose-ketones test (KETO-DIASTIX) Strp, Check urine ketones when BG>300, Disp: 100 strip, Rfl: 3    blood-glucose sensor (DEXCOM G7 SENSOR) Fani, 1 each by Misc.(Non-Drug; Combo Route) route every 10 days., Disp: 3 each, Rfl: 11    insulin (LANTUS SOLOSTAR U-100 INSULIN) glargine 100 units/mL SubQ pen, Inject 17 units daily as directed. " Give if pump failure or pump holiday. (Patient not taking: Reported on 7/5/2024), Disp: 6 mL, Rfl: 2    ROS as above    Objective:     Vitals:    07/05/24 1357   BP: 122/86   Pulse: (!) 129     Wt Readings from Last 3 Encounters:   07/05/24 1357 59.2 kg (130 lb 8.2 oz)   04/11/24 0950 59.2 kg (130 lb 10 oz)   12/05/23 1515 59.9 kg (132 lb 0.9 oz) (57%, Z= 0.17)*     * Growth percentiles are based on CDC (Girls, 2-20 Years) data.     Body mass index is 21.72 kg/m².  Physical Exam    LABS    Chemistry        Component Value Date/Time     04/04/2023 1259    K 3.7 04/04/2023 1259     04/04/2023 1259    CO2 26 04/04/2023 1259    BUN 10 04/04/2023 1259    CREATININE 1.0 04/04/2023 1259     (H) 04/04/2023 1259        Component Value Date/Time    CALCIUM 9.6 04/04/2023 1259    ALKPHOS 83 04/04/2023 1259    AST 16 04/04/2023 1259    ALT 8 (L) 04/04/2023 1259    BILITOT 0.9 04/04/2023 1259    ESTGFRAFRICA >60.0 12/17/2021 1230    EGFRNONAA >60.0 12/17/2021 1230              Assessment and Plan     Type 1 diabetes mellitus without complication  - Pump and sensor download reviewed today, significant for hyperglycemia likely due to missed meal-time boluses.  - Continue with current pump settings.  - Counseled patient regarding consistent meal time bolusing, encouraged to continue lifestyle modifications.  - Will change CGM to Dexcom 7, prescription sent. Advised patient to update pump software.  - Will repeat A1C today.    Insulin pump status  As above    Gender dysphoria in pediatric patient  - Currently on testosterone.  -Will establish care with Dr. Escobar for transgender care.        RTC 3 months      I have reviewed and concur with Dr. Jackson's history, physical, assessment, and plan.  I have personally interviewed and examined the patient.    Transferring care from peds. Pump and sensor download reviewed and significant for hyperglycemia related to missed boluses and when off transmitter and in manual  mode  Will change to G7, sent to pharmacy  Unsure where he gets supplies, should be through "[employer] and we will give contact information  Discussed bolusing consistently with meals which he is aware he needs to work on    Charmaine Green MD          Visit today included increased complexity associated with the care of the problems addressed and managing the longitudinal care of the patient due to the serious and/or complex managed problems

## 2024-07-05 NOTE — PATIENT INSTRUCTIONS
We will send prescriptions for Dexcom G7 today, you can start using it once you update the pump software on tconnect website.

## 2024-10-18 ENCOUNTER — PATIENT MESSAGE (OUTPATIENT)
Dept: PEDIATRIC ENDOCRINOLOGY | Facility: CLINIC | Age: 19
End: 2024-10-18
Payer: MEDICAID

## 2025-01-10 ENCOUNTER — TELEPHONE (OUTPATIENT)
Dept: ELECTROPHYSIOLOGY | Facility: CLINIC | Age: 20
End: 2025-01-10
Payer: MEDICAID

## 2025-01-10 NOTE — TELEPHONE ENCOUNTER
----- Message from [employer] sent at 1/10/2025 12:46 PM CST -----    Patient Returning Call        Who Called:pt  Does the patient know what this is regarding?:need an appt please call  Would the patient rather a call back or a response via [facility]? call  Best Call Back Number:[phone]  Additional Information: call back

## 2025-02-25 ENCOUNTER — PATIENT MESSAGE (OUTPATIENT)
Dept: DIABETES | Facility: CLINIC | Age: 20
End: 2025-02-25
Payer: MEDICAID

## 2025-03-10 ENCOUNTER — PATIENT MESSAGE (OUTPATIENT)
Dept: DIABETES | Facility: CLINIC | Age: 20
End: 2025-03-10
Payer: MEDICAID

## 2025-03-11 ENCOUNTER — TELEPHONE (OUTPATIENT)
Dept: ENDOCRINOLOGY | Facility: CLINIC | Age: 20
End: 2025-03-11
Payer: MEDICAID

## 2025-03-11 DIAGNOSIS — E10.649 UNCONTROLLED TYPE 1 DIABETES MELLITUS WITH HYPOGLYCEMIA WITHOUT COMA: ICD-10-CM

## 2025-03-11 RX ORDER — INSULIN ASPART 100 [IU]/ML
INJECTION, SOLUTION INTRAVENOUS; SUBCUTANEOUS
Qty: 30 ML | Refills: 3 | Status: SHIPPED | OUTPATIENT
Start: 2025-03-11

## 2025-03-11 NOTE — TELEPHONE ENCOUNTER
----- Message from Charmaine Green MD sent at 3/11/2025  9:07 AM CDT -----  Needs pump clinic visit next available

## 2025-03-28 ENCOUNTER — OFFICE VISIT (OUTPATIENT)
Dept: ENDOCRINOLOGY | Facility: CLINIC | Age: 20
End: 2025-03-28
Payer: MEDICAID

## 2025-03-28 VITALS
DIASTOLIC BLOOD PRESSURE: 70 MMHG | BODY MASS INDEX: 20.32 KG/M2 | HEIGHT: 65 IN | SYSTOLIC BLOOD PRESSURE: 106 MMHG | WEIGHT: 121.94 LBS

## 2025-03-28 DIAGNOSIS — Z78.9 FEMALE-TO-MALE TRANSGENDER PERSON: ICD-10-CM

## 2025-03-28 DIAGNOSIS — Z79.899 TRANSGENDER MAN ON HORMONE THERAPY: Primary | ICD-10-CM

## 2025-03-28 DIAGNOSIS — E10.649 UNCONTROLLED TYPE 1 DIABETES MELLITUS WITH HYPOGLYCEMIA WITHOUT COMA: ICD-10-CM

## 2025-03-28 DIAGNOSIS — F64.0 TRANSGENDER MAN ON HORMONE THERAPY: Primary | ICD-10-CM

## 2025-03-28 PROCEDURE — 99213 OFFICE O/P EST LOW 20 MIN: CPT | Mod: PBBFAC | Performed by: INTERNAL MEDICINE

## 2025-03-28 PROCEDURE — 99999 PR PBB SHADOW E&M-EST. PATIENT-LVL III: CPT | Mod: PBBFAC,,, | Performed by: INTERNAL MEDICINE

## 2025-03-28 RX ORDER — TESTOSTERONE CYPIONATE 200 MG/ML
INJECTION, SOLUTION INTRAMUSCULAR
Qty: 4 ML | Refills: 5 | Status: SHIPPED | OUTPATIENT
Start: 2025-03-28

## 2025-03-28 RX ORDER — DIVALPROEX SODIUM 500 MG/1
500 TABLET, FILM COATED, EXTENDED RELEASE ORAL DAILY
COMMUNITY
Start: 2025-03-13

## 2025-03-28 NOTE — ASSESSMENT & PLAN NOTE
Blood glucose safe.  Short visit so he can go charge his pump.  Made an appointment with pump clinic prior to leaving

## 2025-03-28 NOTE — PROGRESS NOTES
"Subjective:      Patient ID: Lois Mckeon is a 19 y.o.    Chief Complaint:  Gender Identity Disorder      History of Present Illness    Abbreviated visit due to him needing to leave and charge his pump    Seeing Dr Green for T1DM       Was previously followed in pediatric endocrinology.  He started low-dose testosterone in August of 2019.  He was on 100 mg weekly- out for months - eager to restart    Lmp early 3/2025     He has been followed by Psychiatry    With regards to the type 1 diabetes he is followed by our diabetologist Dr. Green.  On pump and sensor   Pump is dead and is not using sensor   Checked bg 261 -- he tells me has access to a  once he leaves the office      ROS:   As above    Objective:     /70 (BP Location: Right arm, Patient Position: Sitting)   Ht 5' 5" (1.651 m)   Wt 55.3 kg (121 lb 14.6 oz)   BMI 20.29 kg/m²     Body mass index is 20.29 kg/m².      Physical Exam  Vitals reviewed.   Constitutional:       Appearance: Normal appearance.   Neck:      Comments: No goiter   Cardiovascular:      Rate and Rhythm: Normal rate.   Pulmonary:      Effort: Pulmonary effort is normal.   Abdominal:      Palpations: Abdomen is soft.   Musculoskeletal:      Right lower leg: No edema.      Left lower leg: No edema.   Psychiatric:         Mood and Affect: Mood normal.                Lab Review:   Lab Results   Component Value Date    HGBA1C 7.9 (H) 07/05/2024     Lab Results   Component Value Date    CHOL 180 09/18/2023    HDL 51 09/18/2023    LDLCALC 103.0 09/18/2023    TRIG 130 09/18/2023    CHOLHDL 28.3 09/18/2023     Lab Results   Component Value Date     04/04/2023    K 3.7 04/04/2023     04/04/2023    CO2 26 04/04/2023     (H) 04/04/2023    BUN 10 04/04/2023    CREATININE 1.0 04/04/2023    CALCIUM 9.6 04/04/2023    PROT 7.5 04/04/2023    ALBUMIN 4.1 04/04/2023    BILITOT 0.9 04/04/2023    ALKPHOS 83 04/04/2023    AST 16 04/04/2023    ALT 8 (L) 04/04/2023    " ANIONGAP 8 04/04/2023    ESTGFRAFRICA >60.0 12/17/2021    EGFRNONAA >60.0 12/17/2021    TSH 1.195 04/11/2024     Vit D, 25-Hydroxy   Date Value Ref Range Status   07/05/2024 14 (L) 30 - 96 ng/mL Final     Comment:     Vitamin D deficiency.........<10 ng/mL                              Vitamin D insufficiency......10-29 ng/mL       Vitamin D sufficiency........> or equal to 30 ng/mL  Vitamin D toxicity............>100 ng/mL       Lab Results   Component Value Date    WBC 4.91 04/04/2023    HGB 14.1 09/18/2023    HCT 44.2 04/04/2023    MCV 95 04/04/2023     04/04/2023           Assessment and Plan     Transgender man on hormone therapy  Provided new prescription.  Reviewed the need to use contraception if having sexual activity that may  lead pregnancy.  Set up a visit with Dr. Green and pump Clinic ASAP.  Reviewed that I will see him in clinic once a year during his pump clinic visits    Virtual visit with me in 3 months    Uncontrolled type 1 diabetes mellitus with hypoglycemia  Blood glucose safe.  Short visit so he can go charge his pump.  Made an appointment with pump clinic prior to leaving

## 2025-03-28 NOTE — ASSESSMENT & PLAN NOTE
Provided new prescription.  Reviewed the need to use contraception if having sexual activity that may  lead pregnancy.  Set up a visit with Dr. Green and pump Clinic ASAP.  Reviewed that I will see him in clinic once a year during his pump clinic visits    Virtual visit with me in 3 months

## 2025-04-10 PROBLEM — I80.10 THROMBOPHLEBITIS OF LEG, FEMORAL VEIN, DEEP: Status: ACTIVE | Noted: 2020-09-03

## 2025-04-17 ENCOUNTER — PATIENT MESSAGE (OUTPATIENT)
Dept: DIABETES | Facility: CLINIC | Age: 20
End: 2025-04-17
Payer: MEDICAID

## 2025-04-17 DIAGNOSIS — E10.65 TYPE 1 DIABETES MELLITUS WITH HYPERGLYCEMIA: ICD-10-CM

## 2025-04-17 RX ORDER — INSULIN GLARGINE 100 [IU]/ML
INJECTION, SOLUTION SUBCUTANEOUS
Qty: 6 ML | Refills: 2 | Status: CANCELLED | OUTPATIENT
Start: 2025-04-17

## 2025-04-17 RX ORDER — INSULIN GLARGINE 100 [IU]/ML
22 INJECTION, SOLUTION SUBCUTANEOUS DAILY
Qty: 15 ML | Refills: 0 | Status: SHIPPED | OUTPATIENT
Start: 2025-04-17

## 2025-04-17 RX ORDER — PEN NEEDLE, DIABETIC 30 GX3/16"
1 NEEDLE, DISPOSABLE MISCELLANEOUS DAILY
Qty: 100 EACH | Refills: 3 | Status: SHIPPED | OUTPATIENT
Start: 2025-04-17

## 2025-04-17 NOTE — TELEPHONE ENCOUNTER
Lov in pump clinic 7/2024  Pump clinic appt in May    Current lantus rx is from 2022 so not sure if the directions on it are accurate

## 2025-05-10 DIAGNOSIS — E10.65 TYPE 1 DIABETES MELLITUS WITH HYPERGLYCEMIA: ICD-10-CM

## 2025-05-12 RX ORDER — INSULIN GLARGINE 100 [IU]/ML
22 INJECTION, SOLUTION SUBCUTANEOUS DAILY
Qty: 19.8 ML | Refills: 1 | Status: SHIPPED | OUTPATIENT
Start: 2025-05-12

## 2025-05-26 ENCOUNTER — OFFICE VISIT (OUTPATIENT)
Dept: ENDOCRINOLOGY | Facility: CLINIC | Age: 20
End: 2025-05-26
Payer: MEDICAID

## 2025-05-26 VITALS
SYSTOLIC BLOOD PRESSURE: 122 MMHG | BODY MASS INDEX: 21.18 KG/M2 | HEIGHT: 65 IN | WEIGHT: 127.13 LBS | DIASTOLIC BLOOD PRESSURE: 80 MMHG

## 2025-05-26 DIAGNOSIS — F64.0 TRANSGENDER MAN ON HORMONE THERAPY: ICD-10-CM

## 2025-05-26 DIAGNOSIS — E10.9 TYPE 1 DIABETES MELLITUS WITHOUT COMPLICATION: Primary | ICD-10-CM

## 2025-05-26 DIAGNOSIS — Z79.899 TRANSGENDER MAN ON HORMONE THERAPY: ICD-10-CM

## 2025-05-26 DIAGNOSIS — E10.649 UNCONTROLLED TYPE 1 DIABETES MELLITUS WITH HYPOGLYCEMIA WITHOUT COMA: ICD-10-CM

## 2025-05-26 PROCEDURE — 1159F MED LIST DOCD IN RCRD: CPT | Mod: CPTII,,, | Performed by: INTERNAL MEDICINE

## 2025-05-26 PROCEDURE — 3079F DIAST BP 80-89 MM HG: CPT | Mod: CPTII,,, | Performed by: INTERNAL MEDICINE

## 2025-05-26 PROCEDURE — 99214 OFFICE O/P EST MOD 30 MIN: CPT | Mod: PBBFAC

## 2025-05-26 PROCEDURE — 99999 PR PBB SHADOW E&M-EST. PATIENT-LVL IV: CPT | Mod: PBBFAC,,,

## 2025-05-26 PROCEDURE — 3008F BODY MASS INDEX DOCD: CPT | Mod: CPTII,,, | Performed by: INTERNAL MEDICINE

## 2025-05-26 PROCEDURE — 99214 OFFICE O/P EST MOD 30 MIN: CPT | Mod: S$PBB,,, | Performed by: INTERNAL MEDICINE

## 2025-05-26 PROCEDURE — 95251 CONT GLUC MNTR ANALYSIS I&R: CPT | Mod: ,,, | Performed by: INTERNAL MEDICINE

## 2025-05-26 PROCEDURE — 1160F RVW MEDS BY RX/DR IN RCRD: CPT | Mod: CPTII,,, | Performed by: INTERNAL MEDICINE

## 2025-05-26 PROCEDURE — G2211 COMPLEX E/M VISIT ADD ON: HCPCS | Mod: S$PBB,,, | Performed by: INTERNAL MEDICINE

## 2025-05-26 PROCEDURE — 3074F SYST BP LT 130 MM HG: CPT | Mod: CPTII,,, | Performed by: INTERNAL MEDICINE

## 2025-05-26 RX ORDER — GLUCAGON 3 MG/1
1 POWDER NASAL
Qty: 2 EACH | Refills: 2 | Status: SHIPPED | OUTPATIENT
Start: 2025-05-26

## 2025-05-26 RX ORDER — INSULIN ASPART 100 [IU]/ML
INJECTION, SOLUTION INTRAVENOUS; SUBCUTANEOUS
Qty: 30 ML | Refills: 3 | Status: SHIPPED | OUTPATIENT
Start: 2025-05-26

## 2025-05-26 RX ORDER — BLOOD-GLUCOSE SENSOR
1 EACH MISCELLANEOUS
Qty: 3 EACH | Refills: 11 | Status: SHIPPED | OUTPATIENT
Start: 2025-05-26 | End: 2026-05-26

## 2025-05-26 NOTE — PATIENT INSTRUCTIONS
Take lantus 18 units daily if off pump  Use carb ratio of 1 unit per 8 grams of carbs for meals    We made carb ratio in pump 8 all day. If still too strong, we can make it a 10

## 2025-05-26 NOTE — ASSESSMENT & PLAN NOTE
Dexcom reviewed and with global hyperglycemia likely due to insufficient prandial insulin  Reviewed dosing for when off pump but will also get back on pump as soon as gets supplies  Prescriptions for insulin, baqsimi and Dexcom updated    Will make the following changes:  Patient Instructions   Take lantus 18 units daily if off pump  Use carb ratio of 1 unit per 8 grams of carbs for meals    We made carb ratio in pump 8 all day. If still too strong, we can make it a 10    Try to bolus for all meals  Labs before next visit

## 2025-05-26 NOTE — PROGRESS NOTES
Lois Mckeon is a 19 y.o. male presenting for evaluation of T1DM.    Last visit in 3/2025 with Dr. Escobar  Last pump clinic 7/2024    History of Present Illness  T1DM  Diagnosed at age  3 yrs. Has brother with type 1 as well  Previously on lantus and Novolog before starting insulin pump 6-7 yrs ago.  Known complications: none    Since last visit has run out of pump supplies so using injections    Taking lantus 22 units daily  For meals usually 1-2 units because says doesn't eat much or finish food  Eating fruit, bread and meat    Thinks CR on pump was too strong when he was on it which made him scared to bolus    Glucagon: no  Back-up basal: yes    Diet/Exercise  No breakfast or lunch usually  First full meal of the day around 3-5 pm- Ham sandwiches, spaghetti, beans, chicken  Snack with Fruit cup around bed time  Hydrates with Gatorade    Last Hgb A1C:  Lab Results   Component Value Date    HGBA1C 7.9 (H) 07/05/2024         Glucose Monitoring:  Meter/CGM: Dexcom in media      Hypoglycemic Episodes: as above    DKA: denies    Screening / DM Complications:    Nephropathy:  ACEi/ARB: Not taking  Lab Results   Component Value Date    MICALBCREAT Unable to calculate 11/21/2022       Last Lipid Panel:  Statin: Not taking  Lab Results   Component Value Date    LDLCALC 103.0 09/18/2023       Last foot exam : 12/05/2023  Last eye exam: sees retina specialist Dr. Mireles    Lab Results   Component Value Date    TSH 1.195 04/11/2024       Lab Results   Component Value Date    TTGIGA 6 04/09/2021       Aspirin: no        Current Outpatient Medications:     ACETAMINOPHEN (TYLENOL ORAL), Take by mouth as needed., Disp: , Rfl:     acetone, urine, test (KETOSTIX) Strp, PLEASE USE AS DIRECTED TO TEST FOR URINE KETONES WITH BLOOD SUGAR >250 MG/DL OR WHEN PATIENT IS ILL, Disp: 100 strip, Rfl: 4    blood-glucose meter Misc, Use to check blood glucose up to 8 times per day as directed by provider., Disp: 1 each, Rfl: 0     "divalproex ER (DEPAKOTE ER) 500 MG Tb24 24 hr tablet, Take 500 mg by mouth once daily., Disp: , Rfl:     EASY TOUCH ALCOHOL PREP PADS PadM, USE BEFORE AND AFTER TESTING 10 TIMES DAILY, Disp: 300 each, Rfl: 0    insulin glargine U-100, Lantus, (LANTUS SOLOSTAR U-100 INSULIN) 100 unit/mL (3 mL) InPn pen, Inject 22 Units into the skin once daily. To be used in case of insulin pump failure, Disp: 19.8 mL, Rfl: 1    ketone blood test (PRECISION XTRA B-KETONE) Strp, USE AS DIRECTED FOR BLOOD KETONE TESTING, Disp: 30 strip, Rfl: 4    lancets (ONETOUCH DELICA LANCETS) 33 gauge Misc, 1 lancet by Misc.(Non-Drug; Combo Route) route as needed (patient to test 10 times daily). Patient testing 10 times daily, Disp: 300 each, Rfl: 3    needle, disp, 21 G 21 gauge x 1" Ndle, Use as directed to draw up testosterone, Disp: 30 each, Rfl: 2    pen needle, diabetic (BD ULTRA-FINE SERENA PEN NEEDLE) 32 gauge x 5/32" Ndle, 1 each by Misc.(Non-Drug; Combo Route) route once daily., Disp: 100 each, Rfl: 3    QUEtiapine (SEROQUEL) 300 MG Tab, Take 300 mg by mouth every evening. Take 3 pills, Disp: , Rfl:     syringe, disposable, 1 mL Syrg, Use as directed to inject testosterone, Disp: 15 each, Rfl: 4    testosterone cypionate (DEPOTESTOTERONE CYPIONATE) 200 mg/mL injection, INJECT 0.5 ML (100 MG TOTAL) INTO THE MUSCLE ONCE A WEEK., Disp: 4 mL, Rfl: 5    TRUE METRIX GLUCOSE TEST STRIP Strp, Use as directed to test blood glucose level up to 8 times a day, Disp: 200 each, Rfl: 2    urine glucose-ketones test (KETO-DIASTIX) Strp, Check urine ketones when BG>300, Disp: 100 strip, Rfl: 3    blood-glucose sensor (DEXCOM G7 SENSOR) Fani, 1 each by Misc.(Non-Drug; Combo Route) route every 10 days., Disp: 3 each, Rfl: 11    ergocalciferol (ERGOCALCIFEROL) 50,000 unit Cap, Take 1 capsule by mouth every 7 days. (Patient not taking: Reported on 5/26/2025), Disp: , Rfl:     glucagon (BAQSIMI) 3 mg/actuation Spry, 1 spray by Nasal route as needed " (hypoglycemia)., Disp: 2 each, Rfl: 2    hydrOXYzine pamoate (VISTARIL) 50 MG Cap, Take 50 mg by mouth nightly as needed. (Patient not taking: Reported on 5/26/2025), Disp: , Rfl:     insulin aspart U-100 (NOVOLOG U-100 INSULIN ASPART) 100 unit/mL injection, ADMINISTER 200 UNITS VIA INSULIN PUMP EVERY 3 DAYS AS DIRECTED, Disp: 30 mL, Rfl: 3    mirtazapine (REMERON) 30 MG tablet, Take 30 mg by mouth every evening. (Patient not taking: Reported on 5/26/2025), Disp: , Rfl:     nystatin (MYCOSTATIN) 100,000 unit/mL suspension, Take by mouth. (Patient not taking: Reported on 5/26/2025), Disp: , Rfl:     QUEtiapine (SEROQUEL) 400 MG tablet, 800 mg. (Patient not taking: Reported on 5/26/2025), Disp: , Rfl:     ROS as above    Objective:     Vitals:    05/26/25 1304   BP: 122/80       Wt Readings from Last 3 Encounters:   05/26/25 1304 57.6 kg (127 lb 1.5 oz)   03/28/25 0818 55.3 kg (121 lb 14.6 oz)   07/05/24 1357 59.2 kg (130 lb 8.2 oz)     Body mass index is 21.15 kg/m².  Physical Exam    Lipohypertrophy around umbilicus    LABS    Chemistry        Component Value Date/Time     04/04/2023 1259    K 3.7 04/04/2023 1259     04/04/2023 1259    CO2 26 04/04/2023 1259    BUN 10 04/04/2023 1259    CREATININE 1.0 04/04/2023 1259     (H) 04/04/2023 1259        Component Value Date/Time    CALCIUM 9.6 04/04/2023 1259    ALKPHOS 83 04/04/2023 1259    AST 16 04/04/2023 1259    ALT 8 (L) 04/04/2023 1259    BILITOT 0.9 04/04/2023 1259    ESTGFRAFRICA >60.0 12/17/2021 1230    EGFRNONAA >60.0 12/17/2021 1230              Assessment and Plan     Type 1 diabetes mellitus without complication  Dexcom reviewed and with global hyperglycemia likely due to insufficient prandial insulin  Reviewed dosing for when off pump but will also get back on pump as soon as gets supplies  Prescriptions for insulin, baqsimi and Dexcom updated    Will make the following changes:  Patient Instructions   Take lantus 18 units daily if off  pump  Use carb ratio of 1 unit per 8 grams of carbs for meals    We made carb ratio in pump 8 all day. If still too strong, we can make it a 10    Try to bolus for all meals  Labs before next visit    Transgender man on hormone therapy  Managed by Dr. Escobar          RTCHERISE 3 months with labs          Visit today included increased complexity associated with the care of the problems addressed and managing the longitudinal care of the patient due to the serious and/or complex managed problems

## 2025-07-02 NOTE — PATIENT INSTRUCTIONS
Current Insulin Regimen    Basal insulin changed to 0.5 units/hr    I:C ratio changed to 1:35    ISF changed to 1 unit for every 50 above 120      Next Appointment: Follow up in 3 months with NP and dietician      In case of emergency (for example, patient is vomiting or ketones positive), please call [phone] and ask for pediatric endocrinology on call.    For prescription refills, please call during business hours.      [No Acute Distress] : no acute distress [Well-Appearing] : well-appearing [Clear to Auscultation] : lungs were clear to auscultation bilaterally [Normal Rate] : normal rate  [Regular Rhythm] : with a regular rhythm [de-identified] : mild erythema under b/l breasts, skin intact and dry otherwise.  Back with some excoriations, dry

## 2025-07-21 DIAGNOSIS — E10.649 UNCONTROLLED TYPE 1 DIABETES MELLITUS WITH HYPOGLYCEMIA WITHOUT COMA: ICD-10-CM

## 2025-07-21 RX ORDER — INSULIN ASPART 100 [IU]/ML
INJECTION, SOLUTION INTRAVENOUS; SUBCUTANEOUS
Qty: 30 ML | Refills: 3 | Status: SHIPPED | OUTPATIENT
Start: 2025-07-21